# Patient Record
Sex: MALE | Race: WHITE | NOT HISPANIC OR LATINO | Employment: OTHER | URBAN - METROPOLITAN AREA
[De-identification: names, ages, dates, MRNs, and addresses within clinical notes are randomized per-mention and may not be internally consistent; named-entity substitution may affect disease eponyms.]

---

## 2020-09-05 ENCOUNTER — HOSPITAL ENCOUNTER (INPATIENT)
Facility: HOSPITAL | Age: 75
LOS: 1 days | Discharge: HOME | DRG: 602 | End: 2020-09-08
Attending: EMERGENCY MEDICINE | Admitting: EMERGENCY MEDICINE
Payer: MEDICARE

## 2020-09-05 DIAGNOSIS — D84.9 IMMUNOSUPPRESSED STATUS: Primary | ICD-10-CM

## 2020-09-05 DIAGNOSIS — J81.0 ACUTE PULMONARY EDEMA: ICD-10-CM

## 2020-09-05 DIAGNOSIS — R07.9 CHEST PAIN: ICD-10-CM

## 2020-09-05 DIAGNOSIS — Z94.1 HEART TRANSPLANT RECIPIENT: ICD-10-CM

## 2020-09-05 DIAGNOSIS — R78.81 BACTEREMIA: ICD-10-CM

## 2020-09-05 DIAGNOSIS — L03.90 CELLULITIS: ICD-10-CM

## 2020-09-05 DIAGNOSIS — R78.81 STREPTOCOCCAL BACTEREMIA: ICD-10-CM

## 2020-09-05 DIAGNOSIS — L03.114 CELLULITIS OF LEFT UPPER EXTREMITY: ICD-10-CM

## 2020-09-05 DIAGNOSIS — N18.6 ESRD (END STAGE RENAL DISEASE) ON DIALYSIS: ICD-10-CM

## 2020-09-05 DIAGNOSIS — R10.9 ABDOMINAL PAIN: ICD-10-CM

## 2020-09-05 DIAGNOSIS — L03.90 STREPTOCOCCAL CELLULITIS: ICD-10-CM

## 2020-09-05 DIAGNOSIS — Z99.2 ESRD (END STAGE RENAL DISEASE) ON DIALYSIS: ICD-10-CM

## 2020-09-05 DIAGNOSIS — R41.82 AMS (ALTERED MENTAL STATUS): ICD-10-CM

## 2020-09-05 DIAGNOSIS — E87.5 HYPERKALEMIA: ICD-10-CM

## 2020-09-05 DIAGNOSIS — B95.5 STREPTOCOCCAL BACTEREMIA: ICD-10-CM

## 2020-09-05 DIAGNOSIS — N18.6 END STAGE RENAL DISEASE: ICD-10-CM

## 2020-09-05 DIAGNOSIS — B95.5 STREPTOCOCCAL CELLULITIS: ICD-10-CM

## 2020-09-05 PROBLEM — E78.5 HLD (HYPERLIPIDEMIA): Status: ACTIVE | Noted: 2020-09-05

## 2020-09-05 PROBLEM — R79.89 ELEVATED TROPONIN: Status: ACTIVE | Noted: 2020-09-05

## 2020-09-05 PROBLEM — E11.9 TYPE 2 DIABETES MELLITUS, WITH LONG-TERM CURRENT USE OF INSULIN: Status: ACTIVE | Noted: 2020-09-05

## 2020-09-05 PROBLEM — G93.41 ENCEPHALOPATHY, METABOLIC: Status: ACTIVE | Noted: 2020-09-05

## 2020-09-05 PROBLEM — Z79.4 TYPE 2 DIABETES MELLITUS, WITH LONG-TERM CURRENT USE OF INSULIN: Status: ACTIVE | Noted: 2020-09-05

## 2020-09-05 PROBLEM — E03.9 HYPOTHYROIDISM: Status: ACTIVE | Noted: 2020-09-05

## 2020-09-05 LAB
ALBUMIN SERPL BCP-MCNC: 3.5 G/DL (ref 3.5–5.2)
ALP SERPL-CCNC: 78 U/L (ref 55–135)
ALT SERPL W/O P-5'-P-CCNC: 9 U/L (ref 10–44)
ANION GAP SERPL CALC-SCNC: 11 MMOL/L (ref 8–16)
ANION GAP SERPL CALC-SCNC: 9 MMOL/L (ref 8–16)
AST SERPL-CCNC: 17 U/L (ref 10–40)
BASOPHILS # BLD AUTO: 0.02 K/UL (ref 0–0.2)
BASOPHILS NFR BLD: 0.1 % (ref 0–1.9)
BILIRUB SERPL-MCNC: 0.8 MG/DL (ref 0.1–1)
BNP SERPL-MCNC: 1030 PG/ML (ref 0–99)
BUN SERPL-MCNC: 46 MG/DL (ref 8–23)
BUN SERPL-MCNC: 49 MG/DL (ref 8–23)
CALCIUM SERPL-MCNC: 7.5 MG/DL (ref 8.7–10.5)
CALCIUM SERPL-MCNC: 9.2 MG/DL (ref 8.7–10.5)
CHLORIDE SERPL-SCNC: 106 MMOL/L (ref 95–110)
CHLORIDE SERPL-SCNC: 109 MMOL/L (ref 95–110)
CO2 SERPL-SCNC: 18 MMOL/L (ref 23–29)
CO2 SERPL-SCNC: 19 MMOL/L (ref 23–29)
CREAT SERPL-MCNC: 4.7 MG/DL (ref 0.5–1.4)
CREAT SERPL-MCNC: 4.7 MG/DL (ref 0.5–1.4)
DIFFERENTIAL METHOD: ABNORMAL
EOSINOPHIL # BLD AUTO: 0 K/UL (ref 0–0.5)
EOSINOPHIL NFR BLD: 0.1 % (ref 0–8)
ERYTHROCYTE [DISTWIDTH] IN BLOOD BY AUTOMATED COUNT: 13.7 % (ref 11.5–14.5)
EST. GFR  (AFRICAN AMERICAN): 13 ML/MIN/1.73 M^2
EST. GFR  (AFRICAN AMERICAN): 13 ML/MIN/1.73 M^2
EST. GFR  (NON AFRICAN AMERICAN): 11 ML/MIN/1.73 M^2
EST. GFR  (NON AFRICAN AMERICAN): 11 ML/MIN/1.73 M^2
GLUCOSE SERPL-MCNC: 164 MG/DL (ref 70–110)
GLUCOSE SERPL-MCNC: 165 MG/DL (ref 70–110)
HCT VFR BLD AUTO: 33.8 % (ref 40–54)
HGB BLD-MCNC: 10.6 G/DL (ref 14–18)
IMM GRANULOCYTES # BLD AUTO: 0.07 K/UL (ref 0–0.04)
IMM GRANULOCYTES NFR BLD AUTO: 0.5 % (ref 0–0.5)
LACTATE SERPL-SCNC: 1 MMOL/L (ref 0.5–2.2)
LYMPHOCYTES # BLD AUTO: 0.9 K/UL (ref 1–4.8)
LYMPHOCYTES NFR BLD: 6 % (ref 18–48)
MCH RBC QN AUTO: 31 PG (ref 27–31)
MCHC RBC AUTO-ENTMCNC: 31.4 G/DL (ref 32–36)
MCV RBC AUTO: 99 FL (ref 82–98)
MONOCYTES # BLD AUTO: 1.1 K/UL (ref 0.3–1)
MONOCYTES NFR BLD: 7.5 % (ref 4–15)
NEUTROPHILS # BLD AUTO: 12.6 K/UL (ref 1.8–7.7)
NEUTROPHILS NFR BLD: 85.8 % (ref 38–73)
NRBC BLD-RTO: 0 /100 WBC
PLATELET # BLD AUTO: 123 K/UL (ref 150–350)
PMV BLD AUTO: 9.5 FL (ref 9.2–12.9)
POCT GLUCOSE: 161 MG/DL (ref 70–110)
POTASSIUM SERPL-SCNC: 5.4 MMOL/L (ref 3.5–5.1)
POTASSIUM SERPL-SCNC: 6.1 MMOL/L (ref 3.5–5.1)
POTASSIUM SERPL-SCNC: 6.1 MMOL/L (ref 3.5–5.1)
PROCALCITONIN SERPL IA-MCNC: 0.37 NG/ML
PROT SERPL-MCNC: 7.4 G/DL (ref 6–8.4)
RBC # BLD AUTO: 3.42 M/UL (ref 4.6–6.2)
SARS-COV-2 RDRP RESP QL NAA+PROBE: NEGATIVE
SODIUM SERPL-SCNC: 136 MMOL/L (ref 136–145)
SODIUM SERPL-SCNC: 136 MMOL/L (ref 136–145)
TROPONIN I SERPL DL<=0.01 NG/ML-MCNC: 0.03 NG/ML (ref 0–0.03)
TROPONIN I SERPL DL<=0.01 NG/ML-MCNC: 0.1 NG/ML (ref 0–0.03)
WBC # BLD AUTO: 14.64 K/UL (ref 3.9–12.7)

## 2020-09-05 PROCEDURE — G0378 HOSPITAL OBSERVATION PER HR: HCPCS

## 2020-09-05 PROCEDURE — 25000003 PHARM REV CODE 250: Performed by: NURSE PRACTITIONER

## 2020-09-05 PROCEDURE — U0002 COVID-19 LAB TEST NON-CDC: HCPCS

## 2020-09-05 PROCEDURE — 25000003 PHARM REV CODE 250: Performed by: EMERGENCY MEDICINE

## 2020-09-05 PROCEDURE — 84145 PROCALCITONIN (PCT): CPT

## 2020-09-05 PROCEDURE — 93010 EKG 12-LEAD: ICD-10-PCS | Mod: ,,, | Performed by: INTERNAL MEDICINE

## 2020-09-05 PROCEDURE — 93010 ELECTROCARDIOGRAM REPORT: CPT | Mod: ,,, | Performed by: INTERNAL MEDICINE

## 2020-09-05 PROCEDURE — 84484 ASSAY OF TROPONIN QUANT: CPT

## 2020-09-05 PROCEDURE — 84484 ASSAY OF TROPONIN QUANT: CPT | Mod: 91

## 2020-09-05 PROCEDURE — 80048 BASIC METABOLIC PNL TOTAL CA: CPT

## 2020-09-05 PROCEDURE — 96372 THER/PROPH/DIAG INJ SC/IM: CPT

## 2020-09-05 PROCEDURE — 96367 TX/PROPH/DG ADDL SEQ IV INF: CPT

## 2020-09-05 PROCEDURE — 63600175 PHARM REV CODE 636 W HCPCS: Performed by: EMERGENCY MEDICINE

## 2020-09-05 PROCEDURE — 80053 COMPREHEN METABOLIC PANEL: CPT

## 2020-09-05 PROCEDURE — 87040 BLOOD CULTURE FOR BACTERIA: CPT | Mod: 59

## 2020-09-05 PROCEDURE — 83605 ASSAY OF LACTIC ACID: CPT

## 2020-09-05 PROCEDURE — 99285 EMERGENCY DEPT VISIT HI MDM: CPT | Mod: 25

## 2020-09-05 PROCEDURE — 83880 ASSAY OF NATRIURETIC PEPTIDE: CPT

## 2020-09-05 PROCEDURE — 63600175 PHARM REV CODE 636 W HCPCS: Performed by: NURSE PRACTITIONER

## 2020-09-05 PROCEDURE — 93005 ELECTROCARDIOGRAM TRACING: CPT

## 2020-09-05 PROCEDURE — 36415 COLL VENOUS BLD VENIPUNCTURE: CPT

## 2020-09-05 PROCEDURE — 96365 THER/PROPH/DIAG IV INF INIT: CPT

## 2020-09-05 PROCEDURE — 85025 COMPLETE CBC W/AUTO DIFF WBC: CPT

## 2020-09-05 RX ORDER — GLUCAGON 1 MG
1 KIT INJECTION
Status: DISCONTINUED | OUTPATIENT
Start: 2020-09-05 | End: 2020-09-09 | Stop reason: HOSPADM

## 2020-09-05 RX ORDER — MORPHINE SULFATE 30 MG/1
1 TABLET, FILM COATED, EXTENDED RELEASE ORAL DAILY PRN
COMMUNITY
Start: 2020-08-05

## 2020-09-05 RX ORDER — CETIRIZINE HYDROCHLORIDE 10 MG/1
10 TABLET ORAL DAILY
Status: DISCONTINUED | OUTPATIENT
Start: 2020-09-06 | End: 2020-09-09 | Stop reason: HOSPADM

## 2020-09-05 RX ORDER — FLUTICASONE PROPIONATE 50 MCG
1 SPRAY, SUSPENSION (ML) NASAL DAILY PRN
COMMUNITY

## 2020-09-05 RX ORDER — LEVOTHYROXINE SODIUM 150 UG/1
150 TABLET ORAL DAILY
COMMUNITY
Start: 2020-06-04

## 2020-09-05 RX ORDER — NETARSUDIL 0.2 MG/ML
1 SOLUTION/ DROPS OPHTHALMIC; TOPICAL NIGHTLY
COMMUNITY
Start: 2020-08-17

## 2020-09-05 RX ORDER — GABAPENTIN 300 MG/1
300 CAPSULE ORAL 2 TIMES DAILY
COMMUNITY
Start: 2020-06-07

## 2020-09-05 RX ORDER — GABAPENTIN 300 MG/1
300 CAPSULE ORAL 2 TIMES DAILY
Status: DISCONTINUED | OUTPATIENT
Start: 2020-09-05 | End: 2020-09-09 | Stop reason: HOSPADM

## 2020-09-05 RX ORDER — TACROLIMUS 1 MG/1
2 CAPSULE ORAL 2 TIMES DAILY
Status: DISCONTINUED | OUTPATIENT
Start: 2020-09-05 | End: 2020-09-09 | Stop reason: HOSPADM

## 2020-09-05 RX ORDER — MYCOPHENOLATE MOFETIL 500 MG/1
500 TABLET ORAL 2 TIMES DAILY
Status: DISCONTINUED | OUTPATIENT
Start: 2020-09-06 | End: 2020-09-09 | Stop reason: HOSPADM

## 2020-09-05 RX ORDER — LEVOTHYROXINE SODIUM 150 UG/1
150 TABLET ORAL DAILY
Status: DISCONTINUED | OUTPATIENT
Start: 2020-09-06 | End: 2020-09-09 | Stop reason: HOSPADM

## 2020-09-05 RX ORDER — CETIRIZINE HYDROCHLORIDE 10 MG/1
10 TABLET ORAL DAILY
COMMUNITY

## 2020-09-05 RX ORDER — ATORVASTATIN CALCIUM 10 MG/1
20 TABLET, FILM COATED ORAL DAILY
Status: DISCONTINUED | OUTPATIENT
Start: 2020-09-06 | End: 2020-09-09 | Stop reason: HOSPADM

## 2020-09-05 RX ORDER — ACETAMINOPHEN 325 MG/1
650 TABLET ORAL EVERY 6 HOURS PRN
Status: DISCONTINUED | OUTPATIENT
Start: 2020-09-05 | End: 2020-09-09 | Stop reason: HOSPADM

## 2020-09-05 RX ORDER — ATORVASTATIN CALCIUM 20 MG/1
20 TABLET, FILM COATED ORAL DAILY
COMMUNITY
Start: 2020-06-03

## 2020-09-05 RX ORDER — METOCLOPRAMIDE 5 MG/1
5 TABLET ORAL
Status: DISCONTINUED | OUTPATIENT
Start: 2020-09-06 | End: 2020-09-09 | Stop reason: HOSPADM

## 2020-09-05 RX ORDER — IBUPROFEN 200 MG
16 TABLET ORAL
Status: DISCONTINUED | OUTPATIENT
Start: 2020-09-05 | End: 2020-09-09 | Stop reason: HOSPADM

## 2020-09-05 RX ORDER — FERROUS SULFATE 325(65) MG
1 TABLET ORAL 2 TIMES DAILY
COMMUNITY
Start: 2020-08-12

## 2020-09-05 RX ORDER — PANTOPRAZOLE SODIUM 40 MG/1
40 TABLET, DELAYED RELEASE ORAL DAILY
Status: DISCONTINUED | OUTPATIENT
Start: 2020-09-06 | End: 2020-09-09 | Stop reason: HOSPADM

## 2020-09-05 RX ORDER — INSULIN ASPART 100 [IU]/ML
1-10 INJECTION, SOLUTION INTRAVENOUS; SUBCUTANEOUS
Status: DISCONTINUED | OUTPATIENT
Start: 2020-09-05 | End: 2020-09-09 | Stop reason: HOSPADM

## 2020-09-05 RX ORDER — ESCITALOPRAM OXALATE 5 MG/1
5 TABLET ORAL DAILY
COMMUNITY

## 2020-09-05 RX ORDER — DOCUSATE SODIUM 100 MG/1
100 CAPSULE, LIQUID FILLED ORAL NIGHTLY
Status: DISCONTINUED | OUTPATIENT
Start: 2020-09-05 | End: 2020-09-09 | Stop reason: HOSPADM

## 2020-09-05 RX ORDER — PANTOPRAZOLE SODIUM 40 MG/1
40 TABLET, DELAYED RELEASE ORAL DAILY
COMMUNITY
Start: 2020-06-04

## 2020-09-05 RX ORDER — PEN NEEDLE, DIABETIC 31 GX5/16"
NEEDLE, DISPOSABLE MISCELLANEOUS
COMMUNITY
Start: 2020-08-18

## 2020-09-05 RX ORDER — GABAPENTIN 800 MG/1
800 TABLET ORAL NIGHTLY
COMMUNITY
Start: 2020-06-04

## 2020-09-05 RX ORDER — METOCLOPRAMIDE 5 MG/1
1 TABLET ORAL
COMMUNITY
Start: 2020-07-29

## 2020-09-05 RX ORDER — INSULIN ASPART 100 [IU]/ML
INJECTION, SOLUTION INTRAVENOUS; SUBCUTANEOUS
COMMUNITY

## 2020-09-05 RX ORDER — HYDROCODONE BITARTRATE AND ACETAMINOPHEN 10; 325 MG/1; MG/1
1 TABLET ORAL
Status: ON HOLD | COMMUNITY
Start: 2020-08-05 | End: 2020-09-06 | Stop reason: SDUPTHER

## 2020-09-05 RX ORDER — DONEPEZIL HYDROCHLORIDE 5 MG/1
5 TABLET, FILM COATED ORAL NIGHTLY
Status: DISCONTINUED | OUTPATIENT
Start: 2020-09-05 | End: 2020-09-09 | Stop reason: HOSPADM

## 2020-09-05 RX ORDER — FLUTICASONE PROPIONATE 50 MCG
1 SPRAY, SUSPENSION (ML) NASAL DAILY PRN
Status: DISCONTINUED | OUTPATIENT
Start: 2020-09-05 | End: 2020-09-09 | Stop reason: HOSPADM

## 2020-09-05 RX ORDER — IBUPROFEN 200 MG
24 TABLET ORAL
Status: DISCONTINUED | OUTPATIENT
Start: 2020-09-05 | End: 2020-09-09 | Stop reason: HOSPADM

## 2020-09-05 RX ORDER — TACROLIMUS 1 MG/1
2 CAPSULE ORAL 2 TIMES DAILY
COMMUNITY

## 2020-09-05 RX ORDER — DONEPEZIL HYDROCHLORIDE 5 MG/1
5 TABLET, FILM COATED ORAL NIGHTLY
COMMUNITY
Start: 2020-07-24

## 2020-09-05 RX ORDER — MYCOPHENOLATE MOFETIL 500 MG/1
TABLET ORAL 2 TIMES DAILY
COMMUNITY

## 2020-09-05 RX ORDER — ACETAMINOPHEN 325 MG/1
650 TABLET ORAL
Status: COMPLETED | OUTPATIENT
Start: 2020-09-05 | End: 2020-09-05

## 2020-09-05 RX ORDER — DOCUSATE SODIUM 100 MG/1
100 CAPSULE, LIQUID FILLED ORAL NIGHTLY
COMMUNITY

## 2020-09-05 RX ORDER — ESCITALOPRAM OXALATE 5 MG/1
5 TABLET ORAL DAILY
Status: DISCONTINUED | OUTPATIENT
Start: 2020-09-06 | End: 2020-09-09 | Stop reason: HOSPADM

## 2020-09-05 RX ORDER — FERROUS SULFATE 325(65) MG
325 TABLET, DELAYED RELEASE (ENTERIC COATED) ORAL DAILY
Status: DISCONTINUED | OUTPATIENT
Start: 2020-09-06 | End: 2020-09-09 | Stop reason: HOSPADM

## 2020-09-05 RX ORDER — LIDOCAINE AND PRILOCAINE 25; 25 MG/G; MG/G
CREAM TOPICAL
COMMUNITY

## 2020-09-05 RX ADMIN — INSULIN ASPART 1 UNITS: 100 INJECTION, SOLUTION INTRAVENOUS; SUBCUTANEOUS at 11:09

## 2020-09-05 RX ADMIN — GABAPENTIN 300 MG: 300 CAPSULE ORAL at 09:09

## 2020-09-05 RX ADMIN — ACETAMINOPHEN 650 MG: 325 TABLET ORAL at 04:09

## 2020-09-05 RX ADMIN — DOCUSATE SODIUM 100 MG: 100 CAPSULE, LIQUID FILLED ORAL at 09:09

## 2020-09-05 RX ADMIN — VANCOMYCIN HYDROCHLORIDE 2000 MG: 100 INJECTION, POWDER, LYOPHILIZED, FOR SOLUTION INTRAVENOUS at 08:09

## 2020-09-05 RX ADMIN — DONEPEZIL HYDROCHLORIDE 5 MG: 5 TABLET, FILM COATED ORAL at 09:09

## 2020-09-05 RX ADMIN — PIPERACILLIN AND TAZOBACTAM 4.5 G: 4; .5 INJECTION, POWDER, FOR SOLUTION INTRAVENOUS at 05:09

## 2020-09-05 RX ADMIN — TACROLIMUS 2 MG: 1 CAPSULE ORAL at 09:09

## 2020-09-05 RX ADMIN — SODIUM POLYSTYRENE SULFONATE 30 G: 15 SUSPENSION ORAL; RECTAL at 09:09

## 2020-09-05 NOTE — ED PROVIDER NOTES
SCRIBE #1 NOTE: I, Pasquale Dangelo, am scribing for, and in the presence of, Corby Anderson MD. I have scribed the HPI, ROS, and PEx.     SCRIBE #2 NOTE: I, Katrina Kearns, am scribing for, and in the presence of,  Ck Nettles MD. I have scribed the remaining portions of the note not scribed by Scribe #1.     History      Chief Complaint   Patient presents with    Fever     wife states fever of 101 at home with generalized fatigue; dialysis and heart transplant pt. L arm redness and swelling. given tylenol approx 20min PTA       Review of patient's allergies indicates:  No Known Allergies     HPI   HPI    9/5/2020, 3:17 PM   History obtained from the wife and patient      History of Present Illness: Rafael Doe is a 75 y.o. male patient with a h/o heart transplants and DM who presents to the Emergency Department for fever (101F) which onset gradually this morning. Wife states the pt was supposed to have dialysis at 5:30 AM today but had a fever so he was referred to the ED. Wife noted L arm swelling and erythema on the way to the ER; pt noted a hx of cellulitis. Symptoms are constant and moderate in severity. No mitigating or exacerbating factors reported. Associated sxs include generalized fatigue and confusion. Patient denies any chills, constipation, hematochezia, dysuria, hematuria, urinary frequency, n/v/d, and all other sxs at this time. No prior Tx included. No further complaints or concerns at this time.       Arrival mode: Personal vehicle     PCP: Corby Perdomo MD       Past Medical History:  History reviewed. No pertinent past medical history.    Past Surgical History:  History reviewed. No pertinent past surgical history.     Family History:  History reviewed. No pertinent family history.       Social History:  Social History     Tobacco Use    Smoking status: Unknown   Substance and Sexual Activity    Alcohol use: Unknown    Drug use: Unknown    Sexual activity: Unknown       ROS    Review of Systems   Constitutional: Positive for fatigue (generalized) and fever (101F). Negative for chills.   HENT: Negative for sore throat.    Respiratory: Negative for shortness of breath.    Cardiovascular: Negative for chest pain.   Gastrointestinal: Negative for blood in stool, constipation, diarrhea, nausea and vomiting.   Genitourinary: Negative for dysuria, frequency and hematuria.   Musculoskeletal: Negative for back pain.   Skin: Negative for rash.   Neurological: Negative for weakness.   Hematological: Does not bruise/bleed easily.   Psychiatric/Behavioral: Positive for confusion.   All other systems reviewed and are negative.    Physical Exam      Initial Vitals [09/05/20 1441]   BP Pulse Resp Temp SpO2   121/66 82 20 (!) 100.5 °F (38.1 °C) (!) 89 %      MAP       --          Physical Exam  Nursing Notes and Vital Signs Reviewed.  Constitutional: Patient is in no acute distress. Well-developed and well-nourished.  Head: Atraumatic. Normocephalic.  Eyes: PERRL. EOM intact. Conjunctivae are not pale. No scleral icterus.  ENT: Mucous membranes are moist. Oropharynx is clear and symmetric.    Neck: Supple. Full ROM. No lymphadenopathy.  Cardiovascular: Regular rate. Regular rhythm. No murmurs, rubs, or gallops. Distal pulses are 2+ and symmetric.  Pulmonary/Chest: No respiratory distress. Clear to auscultation bilaterally. No wheezing or rales.  Abdominal: Soft and distended.  There is epigastric tenderness.  No rebound, guarding, or rigidity. Good bowel sounds.  Genitourinary: No CVA tenderness  Musculoskeletal:  Lymphedema.  Right upper extremity dialysis access.  LUE: Positive for swelling and erythema of the left forearm. Negative for tenderness. ROM is normal. Cap refill distally is <2 seconds. Radial pulses are equal and 2+ bilaterally. No motor deficit. No distal sensory deficit. NVI distally.   Neurological:  Drowsy but easily arousable to voice.  Alert oriented  Psychiatric: Normal affect. Good  "eye contact. Appropriate in content.    ED Course    Procedures  ED Vital Signs:  Vitals:    09/05/20 1441 09/05/20 1500 09/05/20 1528 09/05/20 1650   BP: 121/66 (!) 119/57  (!) 142/87   Pulse: 82 83  80   Resp: 20 (!) 22  17   Temp: (!) 100.5 °F (38.1 °C)      TempSrc: Oral      SpO2: (!) 89% (!) 92%  (!) 91%   Weight:   116.8 kg (257 lb 9.6 oz)    Height: 5' 8" (1.727 m)       09/05/20 1655 09/05/20 1700 09/05/20 1800 09/05/20 1830   BP:  (!) 154/89 135/68 124/62   Pulse:  80 80 80   Resp:  (!) 24 20 (!) 21   Temp:       TempSrc:       SpO2: 96% 96% 98% 97%   Weight:       Height:        09/05/20 2004   BP: 132/74   Pulse: 86   Resp: (!) 24   Temp: 98.7 °F (37.1 °C)   TempSrc: Axillary   SpO2: 99%   Weight:    Height:        Abnormal Lab Results:  Labs Reviewed   CBC W/ AUTO DIFFERENTIAL - Abnormal; Notable for the following components:       Result Value    WBC 14.64 (*)     RBC 3.42 (*)     Hemoglobin 10.6 (*)     Hematocrit 33.8 (*)     Mean Corpuscular Volume 99 (*)     Mean Corpuscular Hemoglobin Conc 31.4 (*)     Platelets 123 (*)     Gran # (ANC) 12.6 (*)     Immature Grans (Abs) 0.07 (*)     Lymph # 0.9 (*)     Mono # 1.1 (*)     Gran% 85.8 (*)     Lymph% 6.0 (*)     All other components within normal limits   COMPREHENSIVE METABOLIC PANEL - Abnormal; Notable for the following components:    Potassium 5.4 (*)     CO2 19 (*)     Glucose 165 (*)     BUN, Bld 46 (*)     Creatinine 4.7 (*)     ALT 9 (*)     eGFR if  13 (*)     eGFR if non  11 (*)     All other components within normal limits   B-TYPE NATRIURETIC PEPTIDE - Abnormal; Notable for the following components:    BNP 1,030 (*)     All other components within normal limits   TROPONIN I - Abnormal; Notable for the following components:    Troponin I 0.103 (*)     All other components within normal limits   PROCALCITONIN - Abnormal; Notable for the following components:    Procalcitonin 0.37 (*)     All other components " within normal limits   CULTURE, BLOOD   CULTURE, BLOOD   LACTIC ACID, PLASMA   SARS-COV-2 RNA AMPLIFICATION, QUAL        All Lab Results:   Results for orders placed or performed during the hospital encounter of 09/05/20   CBC auto differential   Result Value Ref Range    WBC 14.64 (H) 3.90 - 12.70 K/uL    RBC 3.42 (L) 4.60 - 6.20 M/uL    Hemoglobin 10.6 (L) 14.0 - 18.0 g/dL    Hematocrit 33.8 (L) 40.0 - 54.0 %    Mean Corpuscular Volume 99 (H) 82 - 98 fL    Mean Corpuscular Hemoglobin 31.0 27.0 - 31.0 pg    Mean Corpuscular Hemoglobin Conc 31.4 (L) 32.0 - 36.0 g/dL    RDW 13.7 11.5 - 14.5 %    Platelets 123 (L) 150 - 350 K/uL    MPV 9.5 9.2 - 12.9 fL    Immature Granulocytes 0.5 0.0 - 0.5 %    Gran # (ANC) 12.6 (H) 1.8 - 7.7 K/uL    Immature Grans (Abs) 0.07 (H) 0.00 - 0.04 K/uL    Lymph # 0.9 (L) 1.0 - 4.8 K/uL    Mono # 1.1 (H) 0.3 - 1.0 K/uL    Eos # 0.0 0.0 - 0.5 K/uL    Baso # 0.02 0.00 - 0.20 K/uL    nRBC 0 0 /100 WBC    Gran% 85.8 (H) 38.0 - 73.0 %    Lymph% 6.0 (L) 18.0 - 48.0 %    Mono% 7.5 4.0 - 15.0 %    Eosinophil% 0.1 0.0 - 8.0 %    Basophil% 0.1 0.0 - 1.9 %    Differential Method Automated    Comprehensive metabolic panel   Result Value Ref Range    Sodium 136 136 - 145 mmol/L    Potassium 5.4 (H) 3.5 - 5.1 mmol/L    Chloride 106 95 - 110 mmol/L    CO2 19 (L) 23 - 29 mmol/L    Glucose 165 (H) 70 - 110 mg/dL    BUN, Bld 46 (H) 8 - 23 mg/dL    Creatinine 4.7 (H) 0.5 - 1.4 mg/dL    Calcium 9.2 8.7 - 10.5 mg/dL    Total Protein 7.4 6.0 - 8.4 g/dL    Albumin 3.5 3.5 - 5.2 g/dL    Total Bilirubin 0.8 0.1 - 1.0 mg/dL    Alkaline Phosphatase 78 55 - 135 U/L    AST 17 10 - 40 U/L    ALT 9 (L) 10 - 44 U/L    Anion Gap 11 8 - 16 mmol/L    eGFR if African American 13 (A) >60 mL/min/1.73 m^2    eGFR if non African American 11 (A) >60 mL/min/1.73 m^2   Lactic Acid, Plasma   Result Value Ref Range    Lactate (Lactic Acid) 1.0 0.5 - 2.2 mmol/L   Brain Natriuretic Peptide   Result Value Ref Range    BNP 1,030 (H) 0 -  99 pg/mL   Troponin I   Result Value Ref Range    Troponin I 0.103 (H) 0.000 - 0.026 ng/mL   Procalcitonin   Result Value Ref Range    Procalcitonin 0.37 (H) <0.25 ng/mL   COVID-19 Rapid Screening   Result Value Ref Range    SARS-CoV-2 RNA, Amplification, Qual Negative Negative         Imaging Results:  Imaging Results          X-Ray Chest 1 View (Final result)  Result time 09/05/20 17:23:04    Final result by Lopez Burrell MD (09/05/20 17:23:04)                 Impression:      See above.      Electronically signed by: Lopez Burrell MD  Date:    09/05/2020  Time:    17:23             Narrative:    EXAMINATION:  XR CHEST 1 VIEW    CLINICAL HISTORY:  Abdominal pain.    FINDINGS:  No prior study.  Cardiomegaly status post CABG.  Right chest wall pacemaker with intact lead.  Right chest wall MediPort with catheter tip right atrium.  Pulmonary vasculature appears congested.  Bibasilar pleuroparenchymal bands with intermixed patchy opacities.  Left greater than right costophrenic angle blunting, pleural thickening versus minimal pleural fluid.                               CT Abdomen Pelvis  Without Contrast (Final result)  Result time 09/05/20 17:01:20    Final result by Lopez Burrell MD (09/05/20 17:01:20)                 Impression:      1. There is no CT evidence of an acute intra-abdominal inflammatory process with history of epigastric pain.  2. Subtle nodular liver surface contour.  Is there history of cirrhosis?  No ascites.  Splenomegaly, 15 cm.  3. No bowel obstruction.  There is stool filling the descending, sigmoid, and rectum.  Correlate for constipation/impaction.  4. Marked bilateral renal atrophy.  5. Marked atherosclerosis.  6. Mild cardiomegaly.  7. Prominent gynecomastia.  All CT scans at this facility are performed  using dose modulation techniques as appropriate to performed exam including the following:  automated exposure control; adjustment of mA and/or kV according to the patients size (this  includes techniques or standardized protocols for targeted exams where dose is matched to indication/reason for exam: i.e. extremities or head);  iterative reconstruction technique.      Electronically signed by: Lopez Burrell MD  Date:    09/05/2020  Time:    17:01             Narrative:    EXAMINATION:  CT ABDOMEN PELVIS WITHOUT CONTRAST    CLINICAL HISTORY:  Epigastric pain;    TECHNIQUE:  Abdominal and pelvic CT performed without contrast.  Coronal and sagittal reformations.    COMPARISON:  None    FINDINGS:  Abdominal CT.  Mild cardiomegaly.  There is marked bilateral gynecomastia.  Bibasilar thick pleuroparenchymal bands of atelectasis or scarring.  Superimposed active infiltrate not excluded particularly at the left lung base with patchy consolidation.    There is mild splenomegaly, 15 x 13 x 5 cm.  Subtle nodular surface contour liver.  Is there history of cirrhosis?  Otherwise, noncontrast evaluation liver, spleen, pancreas, and adrenal glands unremarkable.  There is marked bilateral renal cortical atrophy.  No hydronephrosis.  Bilateral renal artery calcifications.    Normal caliber aorta.  Extensive atherosclerosis aortic, SMA, celiac, normal gallbladder.  No bile duct dilatation.    No bowel obstruction.  The appendix is normal.  There is short segment narrowing of the distal transverse colon, probable contraction without discrete masslike wall thickening.    Degenerative spine.    Focal ventral abdominal wall eventration at the umbilicus without a significant hernia.    Pelvic CT.  The pelvic ring is intact.    Stool-filled descending, sigmoid, and rectum.  Correlate for constipation/impaction.  Pelvic bowel loops are otherwise unremarkable.    Ureters and urinary bladder are unremarkable.  No pelvic mass, free fluid, or lymphadenopathy.  Normal sized prostate gland.  Atherosclerosis.                               CT Head Without Contrast (Final result)  Result time 09/05/20 16:36:20    Final result by  Lopez Burrell MD (09/05/20 16:36:20)                 Impression:      Unremarkable head CT for age.    All CT scans at this facility are performed  using dose modulation techniques as appropriate to performed exam including the following:  automated exposure control; adjustment of mA and/or kV according to the patients size (this includes techniques or standardized protocols for targeted exams where dose is matched to indication/reason for exam: i.e. extremities or head);  iterative reconstruction technique.      Electronically signed by: Lopez Burrell MD  Date:    09/05/2020  Time:    16:36             Narrative:    EXAMINATION:  CT HEAD WITHOUT CONTRAST    CLINICAL HISTORY:  transient alteration of awareness, unspecified    TECHNIQUE:  Routine noncontrast head CT    COMPARISON:  None    FINDINGS:  There is patient motion artifact with subsequent repeat scan with continued beam hardening/motion artifact at the skull base.  There is no evidence of an acute intracranial hemorrhage or abnormal extra-axial fluid collection.  There is age-appropriate moderate cerebral atrophy with ventricular-sulcal congruence.  There is no abnormal increased or decreased density within the brain parenchyma.  Gray-white differentiation preserved.  There is no intracranial mass or mass effect.  The calvarium is intact.  Visualized paranasal sinuses and mastoids are well aerated.                               The EKG was ordered, reviewed, and independently interpreted by the ED provider.  Interpretation time: 1537  Rate: 80 BPM  Rhythm: Atrial-paced rhythm  Interpretation: Left axis deviation. RBBB. Inferior infarct, age undetermined. Abnormal ECG No STEMI.         The Emergency Provider reviewed the vital signs and test results, which are outlined above.    ED Discussion     4:00 PM: Dr. Anderson transfers care of patient to Dr. Nettles pending lab and radiology results.    5:15 PM:  Discussed case heart transplant [facility]  Dr. Allred.  See below    5:20 PM: Dr. Nettles is at bedside to evaluate the pt.     6:36 PM: Discussed pt's case with Dr. Rajwinder Lacy (Nephrology) to inform him that the patient is a heart transplant patient and ESRD patient that is being admitted for cellulitis. The patient will require dialysis during the admission.    7:23 PM: Discussed case with Yomaira Mccall NP ([facility]). Yomaira Mccall NP agrees with current care and management of pt and accepts admission.   Admitting Service: [facility]  Admitting Physician: Dr.Majid PAT Beavers  Admit to: Observation - Telemetry    7:34 PM: Re-evaluated pt. I have discussed test results, shared treatment plan, and the need for admission with patient at bedside. Pt expresses understanding at this time and agree with all information. All questions answered. Pt has no further questions or concerns at this time. Pt is ready for admit.      ED Course as of Sep 05 2044   Sat Sep 05, 2020   1824 Consulted Heart Transplant at [facility] and spoke with Dr. Allred.  He states that is appropriate for patient to be hospitalized here in [place].  He recommends continuing Prograf, holding CellCept during hospitalization, and removing the port if patient has bacteremia.    [DP]      ED Course User Index  [DP] Ck Nettles MD       ED Medication(s):  Medications   vancomycin - pharmacy to dose (has no administration in time range)   vancomycin 2 g in dextrose 5 % 500 mL IVPB (2,000 mg Intravenous New Bag 9/5/20 2015)   piperacillin-tazobactam 4.5 g in dextrose 5 % 100 mL IVPB (ready to mix system) (has no administration in time range)   glucose chewable tablet 16 g (has no administration in time range)   glucose chewable tablet 24 g (has no administration in time range)   dextrose 50% injection 12.5 g (has no administration in time range)   dextrose 50% injection 25 g (has no administration in time range)   glucagon (human recombinant) injection 1 mg (has  no administration in time range)   atorvastatin tablet 20 mg (has no administration in time range)   cetirizine tablet 10 mg (has no administration in time range)   docusate sodium capsule 100 mg (has no administration in time range)   donepeziL tablet 5 mg (has no administration in time range)   escitalopram oxalate tablet 5 mg (has no administration in time range)   ferrous sulfate EC tablet 325 mg (has no administration in time range)   fluticasone propionate 50 mcg/actuation nasal spray 50 mcg (has no administration in time range)   gabapentin capsule 300 mg (has no administration in time range)   levothyroxine tablet 150 mcg (has no administration in time range)   metoclopramide HCl tablet 5 mg (has no administration in time range)   multivitamin tablet (has no administration in time range)   pantoprazole EC tablet 40 mg (has no administration in time range)   netarsudiL 0.02 % ophthalmic solution 1 drop (has no administration in time range)   tacrolimus capsule 2 mg (has no administration in time range)   sodium polystyrene 15 gram/60 mL suspension 30 g (has no administration in time range)   acetaminophen tablet 650 mg (has no administration in time range)   piperacillin-tazobactam 4.5 g in dextrose 5 % 100 mL IVPB (ready to mix system) (0 g Intravenous Stopped 9/5/20 1800)   acetaminophen tablet 650 mg (650 mg Oral Given 9/5/20 1634)           Current Discharge Medication List            Medical Decision Making    Medical Decision Making:   Clinical Tests:   Lab Tests: Ordered and Reviewed  Radiological Study: Ordered and Reviewed  Medical Tests: Ordered and Reviewed           Scribe Attestation:   Scribe #1: I performed the above scribed service and the documentation accurately describes the services I performed. I attest to the accuracy of the note.    Attending:   Physician Attestation Statement for Scribe #1: I, Corby Anderson MD, personally performed the services described in this  documentation, as scribed by Pasquale Dangelo, in my presence, and it is both accurate and complete.       Scribe Attestation:   Scribe #2: I performed the above scribed service and the documentation accurately describes the services I performed. I attest to the accuracy of the note.    Attending Attestation:           Physician Attestation for Scribe:    Physician Attestation Statement for Scribe #2: I, Ck Nettles MD, reviewed documentation, as scribed by Katrina Kearns in my presence, and it is both accurate and complete. I also acknowledge and confirm the content of the note done by Scribe #1.          Clinical Impression       ICD-10-CM ICD-9-CM   1. Immunosuppressed status  D89.9 279.9   2. Abdominal pain  R10.9 789.00   3. AMS (altered mental status)  R41.82 780.97   4. Cellulitis  L03.90 682.9   5. Chest pain  R07.9 786.50   6. End stage renal disease  N18.6 585.6   7. Hyperkalemia  E87.5 276.7   8. Heart transplant recipient  Z94.1 V42.1       Disposition:   Disposition: Placed in Observation  Condition: Fair         Ck Nettles MD  09/05/20 1728

## 2020-09-05 NOTE — ED NOTES
"Pt's wife provided names and telephone numbers of pt's medical team:    Dr. Zia Nguyen ("transplant doctor at [facility]"):  [phone]  Ananya ("transplant coordinator at [facility]"):  [phone]  After Hours ("transplant coordinator at [facility]"):  [phone]  Dr. King (cardiologist):  [phone]  Dr. Corby Perdomo (General Practice):  [phone]  "

## 2020-09-06 PROBLEM — G93.41 ENCEPHALOPATHY, METABOLIC: Status: RESOLVED | Noted: 2020-09-05 | Resolved: 2020-09-06

## 2020-09-06 PROBLEM — E87.5 HYPERKALEMIA: Status: RESOLVED | Noted: 2020-09-05 | Resolved: 2020-09-06

## 2020-09-06 LAB
ANION GAP SERPL CALC-SCNC: 11 MMOL/L (ref 8–16)
ANION GAP SERPL CALC-SCNC: 12 MMOL/L (ref 8–16)
ANION GAP SERPL CALC-SCNC: 13 MMOL/L (ref 8–16)
ANION GAP SERPL CALC-SCNC: 15 MMOL/L (ref 8–16)
ANION GAP SERPL CALC-SCNC: 15 MMOL/L (ref 8–16)
ANION GAP SERPL CALC-SCNC: 17 MMOL/L (ref 8–16)
BASOPHILS # BLD AUTO: 0.01 K/UL (ref 0–0.2)
BASOPHILS NFR BLD: 0.1 % (ref 0–1.9)
BUN SERPL-MCNC: 22 MG/DL (ref 8–23)
BUN SERPL-MCNC: 26 MG/DL (ref 8–23)
BUN SERPL-MCNC: 49 MG/DL (ref 8–23)
BUN SERPL-MCNC: 50 MG/DL (ref 8–23)
BUN SERPL-MCNC: 50 MG/DL (ref 8–23)
BUN SERPL-MCNC: 51 MG/DL (ref 8–23)
BUN SERPL-MCNC: 51 MG/DL (ref 8–23)
BUN SERPL-MCNC: 53 MG/DL (ref 8–23)
CALCIUM SERPL-MCNC: 8.9 MG/DL (ref 8.7–10.5)
CALCIUM SERPL-MCNC: 8.9 MG/DL (ref 8.7–10.5)
CALCIUM SERPL-MCNC: 9 MG/DL (ref 8.7–10.5)
CALCIUM SERPL-MCNC: 9 MG/DL (ref 8.7–10.5)
CALCIUM SERPL-MCNC: 9.1 MG/DL (ref 8.7–10.5)
CALCIUM SERPL-MCNC: 9.2 MG/DL (ref 8.7–10.5)
CALCIUM SERPL-MCNC: 9.3 MG/DL (ref 8.7–10.5)
CALCIUM SERPL-MCNC: 9.3 MG/DL (ref 8.7–10.5)
CHLORIDE SERPL-SCNC: 102 MMOL/L (ref 95–110)
CHLORIDE SERPL-SCNC: 105 MMOL/L (ref 95–110)
CHLORIDE SERPL-SCNC: 106 MMOL/L (ref 95–110)
CHLORIDE SERPL-SCNC: 107 MMOL/L (ref 95–110)
CHLORIDE SERPL-SCNC: 108 MMOL/L (ref 95–110)
CHLORIDE SERPL-SCNC: 109 MMOL/L (ref 95–110)
CO2 SERPL-SCNC: 17 MMOL/L (ref 23–29)
CO2 SERPL-SCNC: 18 MMOL/L (ref 23–29)
CO2 SERPL-SCNC: 19 MMOL/L (ref 23–29)
CO2 SERPL-SCNC: 20 MMOL/L (ref 23–29)
CO2 SERPL-SCNC: 21 MMOL/L (ref 23–29)
CO2 SERPL-SCNC: 26 MMOL/L (ref 23–29)
CREAT SERPL-MCNC: 2.8 MG/DL (ref 0.5–1.4)
CREAT SERPL-MCNC: 2.9 MG/DL (ref 0.5–1.4)
CREAT SERPL-MCNC: 4.6 MG/DL (ref 0.5–1.4)
CREAT SERPL-MCNC: 4.7 MG/DL (ref 0.5–1.4)
CREAT SERPL-MCNC: 4.8 MG/DL (ref 0.5–1.4)
CREAT SERPL-MCNC: 4.9 MG/DL (ref 0.5–1.4)
DIFFERENTIAL METHOD: ABNORMAL
EOSINOPHIL # BLD AUTO: 0 K/UL (ref 0–0.5)
EOSINOPHIL NFR BLD: 0.5 % (ref 0–8)
ERYTHROCYTE [DISTWIDTH] IN BLOOD BY AUTOMATED COUNT: 13.9 % (ref 11.5–14.5)
EST. GFR  (AFRICAN AMERICAN): 12 ML/MIN/1.73 M^2
EST. GFR  (AFRICAN AMERICAN): 13 ML/MIN/1.73 M^2
EST. GFR  (AFRICAN AMERICAN): 23 ML/MIN/1.73 M^2
EST. GFR  (AFRICAN AMERICAN): 24 ML/MIN/1.73 M^2
EST. GFR  (NON AFRICAN AMERICAN): 11 ML/MIN/1.73 M^2
EST. GFR  (NON AFRICAN AMERICAN): 12 ML/MIN/1.73 M^2
EST. GFR  (NON AFRICAN AMERICAN): 20 ML/MIN/1.73 M^2
EST. GFR  (NON AFRICAN AMERICAN): 21 ML/MIN/1.73 M^2
ESTIMATED AVG GLUCOSE: 131 MG/DL (ref 68–131)
GLUCOSE SERPL-MCNC: 130 MG/DL (ref 70–110)
GLUCOSE SERPL-MCNC: 132 MG/DL (ref 70–110)
GLUCOSE SERPL-MCNC: 142 MG/DL (ref 70–110)
GLUCOSE SERPL-MCNC: 154 MG/DL (ref 70–110)
GLUCOSE SERPL-MCNC: 156 MG/DL (ref 70–110)
GLUCOSE SERPL-MCNC: 163 MG/DL (ref 70–110)
GLUCOSE SERPL-MCNC: 197 MG/DL (ref 70–110)
GLUCOSE SERPL-MCNC: 197 MG/DL (ref 70–110)
HBA1C MFR BLD HPLC: 6.2 % (ref 4–5.6)
HCT VFR BLD AUTO: 31 % (ref 40–54)
HGB BLD-MCNC: 9.6 G/DL (ref 14–18)
IMM GRANULOCYTES # BLD AUTO: 0.05 K/UL (ref 0–0.04)
IMM GRANULOCYTES NFR BLD AUTO: 0.6 % (ref 0–0.5)
LYMPHOCYTES # BLD AUTO: 0.9 K/UL (ref 1–4.8)
LYMPHOCYTES NFR BLD: 10.9 % (ref 18–48)
MAGNESIUM SERPL-MCNC: 2.1 MG/DL (ref 1.6–2.6)
MCH RBC QN AUTO: 31 PG (ref 27–31)
MCHC RBC AUTO-ENTMCNC: 31 G/DL (ref 32–36)
MCV RBC AUTO: 100 FL (ref 82–98)
MONOCYTES # BLD AUTO: 0.8 K/UL (ref 0.3–1)
MONOCYTES NFR BLD: 9.7 % (ref 4–15)
NEUTROPHILS # BLD AUTO: 6.6 K/UL (ref 1.8–7.7)
NEUTROPHILS NFR BLD: 78.2 % (ref 38–73)
NRBC BLD-RTO: 0 /100 WBC
PLATELET # BLD AUTO: 118 K/UL (ref 150–350)
PLATELET BLD QL SMEAR: ABNORMAL
PMV BLD AUTO: 10.3 FL (ref 9.2–12.9)
POCT GLUCOSE: 134 MG/DL (ref 70–110)
POCT GLUCOSE: 166 MG/DL (ref 70–110)
POCT GLUCOSE: 178 MG/DL (ref 70–110)
POCT GLUCOSE: 182 MG/DL (ref 70–110)
POCT GLUCOSE: 208 MG/DL (ref 70–110)
POTASSIUM SERPL-SCNC: 3.6 MMOL/L (ref 3.5–5.1)
POTASSIUM SERPL-SCNC: 3.6 MMOL/L (ref 3.5–5.1)
POTASSIUM SERPL-SCNC: 4.7 MMOL/L (ref 3.5–5.1)
POTASSIUM SERPL-SCNC: 4.7 MMOL/L (ref 3.5–5.1)
POTASSIUM SERPL-SCNC: 4.9 MMOL/L (ref 3.5–5.1)
POTASSIUM SERPL-SCNC: 4.9 MMOL/L (ref 3.5–5.1)
POTASSIUM SERPL-SCNC: 5 MMOL/L (ref 3.5–5.1)
POTASSIUM SERPL-SCNC: 5 MMOL/L (ref 3.5–5.1)
POTASSIUM SERPL-SCNC: 5.4 MMOL/L (ref 3.5–5.1)
POTASSIUM SERPL-SCNC: 5.6 MMOL/L (ref 3.5–5.1)
POTASSIUM SERPL-SCNC: 5.6 MMOL/L (ref 3.5–5.1)
POTASSIUM SERPL-SCNC: 6.6 MMOL/L (ref 3.5–5.1)
POTASSIUM SERPL-SCNC: 6.6 MMOL/L (ref 3.5–5.1)
RBC # BLD AUTO: 3.1 M/UL (ref 4.6–6.2)
SODIUM SERPL-SCNC: 139 MMOL/L (ref 136–145)
SODIUM SERPL-SCNC: 140 MMOL/L (ref 136–145)
TROPONIN I SERPL DL<=0.01 NG/ML-MCNC: 0.07 NG/ML (ref 0–0.03)
VANCOMYCIN SERPL-MCNC: 12.7 UG/ML
WBC # BLD AUTO: 8.37 K/UL (ref 3.9–12.7)

## 2020-09-06 PROCEDURE — G0257 UNSCHED DIALYSIS ESRD PT HOS: HCPCS

## 2020-09-06 PROCEDURE — 83735 ASSAY OF MAGNESIUM: CPT

## 2020-09-06 PROCEDURE — 25000003 PHARM REV CODE 250: Performed by: NURSE PRACTITIONER

## 2020-09-06 PROCEDURE — 84484 ASSAY OF TROPONIN QUANT: CPT

## 2020-09-06 PROCEDURE — 99205 PR OFFICE/OUTPT VISIT, NEW, LEVL V, 60-74 MIN: ICD-10-PCS | Mod: 25,,, | Performed by: INTERNAL MEDICINE

## 2020-09-06 PROCEDURE — 90935 HEMODIALYSIS ONE EVALUATION: CPT | Mod: ,,, | Performed by: INTERNAL MEDICINE

## 2020-09-06 PROCEDURE — 96376 TX/PRO/DX INJ SAME DRUG ADON: CPT

## 2020-09-06 PROCEDURE — 99900035 HC TECH TIME PER 15 MIN (STAT)

## 2020-09-06 PROCEDURE — 80202 ASSAY OF VANCOMYCIN: CPT

## 2020-09-06 PROCEDURE — 80048 BASIC METABOLIC PNL TOTAL CA: CPT

## 2020-09-06 PROCEDURE — 83036 HEMOGLOBIN GLYCOSYLATED A1C: CPT

## 2020-09-06 PROCEDURE — 96372 THER/PROPH/DIAG INJ SC/IM: CPT

## 2020-09-06 PROCEDURE — 36415 COLL VENOUS BLD VENIPUNCTURE: CPT

## 2020-09-06 PROCEDURE — G0378 HOSPITAL OBSERVATION PER HR: HCPCS

## 2020-09-06 PROCEDURE — 85025 COMPLETE CBC W/AUTO DIFF WBC: CPT

## 2020-09-06 PROCEDURE — 27000221 HC OXYGEN, UP TO 24 HOURS

## 2020-09-06 PROCEDURE — 80048 BASIC METABOLIC PNL TOTAL CA: CPT | Mod: 91

## 2020-09-06 PROCEDURE — 63600175 PHARM REV CODE 636 W HCPCS: Performed by: NURSE PRACTITIONER

## 2020-09-06 PROCEDURE — 94761 N-INVAS EAR/PLS OXIMETRY MLT: CPT

## 2020-09-06 PROCEDURE — 80100016 HC MAINTENANCE HEMODIALYSIS

## 2020-09-06 PROCEDURE — 86706 HEP B SURFACE ANTIBODY: CPT

## 2020-09-06 PROCEDURE — 99205 OFFICE O/P NEW HI 60 MIN: CPT | Mod: 25,,, | Performed by: INTERNAL MEDICINE

## 2020-09-06 PROCEDURE — 87340 HEPATITIS B SURFACE AG IA: CPT

## 2020-09-06 PROCEDURE — 96375 TX/PRO/DX INJ NEW DRUG ADDON: CPT

## 2020-09-06 PROCEDURE — 90935 PR HEMODIALYSIS, ONE EVALUATION: ICD-10-PCS | Mod: ,,, | Performed by: INTERNAL MEDICINE

## 2020-09-06 RX ORDER — CEPHALEXIN 500 MG/1
500 CAPSULE ORAL EVERY 12 HOURS
Qty: 20 CAPSULE | Refills: 0 | Status: CANCELLED | OUTPATIENT
Start: 2020-09-06

## 2020-09-06 RX ORDER — HYDROCODONE BITARTRATE AND ACETAMINOPHEN 10; 325 MG/1; MG/1
1 TABLET ORAL
Qty: 20 TABLET | Refills: 0 | Status: SHIPPED | OUTPATIENT
Start: 2020-09-06

## 2020-09-06 RX ORDER — CEPHALEXIN 500 MG/1
500 CAPSULE ORAL EVERY 12 HOURS
Status: DISCONTINUED | OUTPATIENT
Start: 2020-09-06 | End: 2020-09-07

## 2020-09-06 RX ORDER — CLINDAMYCIN HYDROCHLORIDE 300 MG/1
300 CAPSULE ORAL 3 TIMES DAILY
Qty: 30 CAPSULE | Refills: 0 | Status: CANCELLED | OUTPATIENT
Start: 2020-09-06

## 2020-09-06 RX ORDER — SULFAMETHOXAZOLE AND TRIMETHOPRIM 800; 160 MG/1; MG/1
1 TABLET ORAL DAILY
Qty: 10 TABLET | Refills: 0 | Status: SHIPPED | OUTPATIENT
Start: 2020-09-06

## 2020-09-06 RX ORDER — VANCOMYCIN HCL IN 5 % DEXTROSE 1G/250ML
1000 PLASTIC BAG, INJECTION (ML) INTRAVENOUS ONCE
Status: COMPLETED | OUTPATIENT
Start: 2020-09-07 | End: 2020-09-07

## 2020-09-06 RX ORDER — CLINDAMYCIN HYDROCHLORIDE 150 MG/1
300 CAPSULE ORAL EVERY 6 HOURS
Status: DISCONTINUED | OUTPATIENT
Start: 2020-09-06 | End: 2020-09-07

## 2020-09-06 RX ORDER — HYDROCODONE BITARTRATE AND ACETAMINOPHEN 10; 325 MG/1; MG/1
1 TABLET ORAL EVERY 6 HOURS PRN
Status: DISCONTINUED | OUTPATIENT
Start: 2020-09-06 | End: 2020-09-09 | Stop reason: HOSPADM

## 2020-09-06 RX ADMIN — INSULIN HUMAN 10 UNITS: 100 INJECTION, SOLUTION PARENTERAL at 02:09

## 2020-09-06 RX ADMIN — HYDROCODONE BITARTRATE AND ACETAMINOPHEN 1 TABLET: 10; 325 TABLET ORAL at 05:09

## 2020-09-06 RX ADMIN — DONEPEZIL HYDROCHLORIDE 5 MG: 5 TABLET, FILM COATED ORAL at 10:09

## 2020-09-06 RX ADMIN — TACROLIMUS 2 MG: 1 CAPSULE ORAL at 10:09

## 2020-09-06 RX ADMIN — CALCIUM GLUCONATE 1 G: 98 INJECTION, SOLUTION INTRAVENOUS at 05:09

## 2020-09-06 RX ADMIN — CLINDAMYCIN HYDROCHLORIDE 300 MG: 150 CAPSULE ORAL at 05:09

## 2020-09-06 RX ADMIN — ATORVASTATIN CALCIUM 20 MG: 10 TABLET, FILM COATED ORAL at 10:09

## 2020-09-06 RX ADMIN — THERA TABS 1 TABLET: TAB at 11:09

## 2020-09-06 RX ADMIN — METOCLOPRAMIDE HYDROCHLORIDE 5 MG: 5 TABLET ORAL at 11:09

## 2020-09-06 RX ADMIN — MYCOPHENOLATE MOFETIL 500 MG: 500 TABLET ORAL at 11:09

## 2020-09-06 RX ADMIN — MYCOPHENOLATE MOFETIL 500 MG: 500 TABLET ORAL at 10:09

## 2020-09-06 RX ADMIN — DEXTROSE MONOHYDRATE 25 G: 500 INJECTION PARENTERAL at 02:09

## 2020-09-06 RX ADMIN — TACROLIMUS 2 MG: 1 CAPSULE ORAL at 05:09

## 2020-09-06 RX ADMIN — GABAPENTIN 300 MG: 300 CAPSULE ORAL at 10:09

## 2020-09-06 RX ADMIN — PANTOPRAZOLE SODIUM 40 MG: 40 TABLET, DELAYED RELEASE ORAL at 10:09

## 2020-09-06 RX ADMIN — SODIUM POLYSTYRENE SULFONATE 30 G: 15 SUSPENSION ORAL; RECTAL at 12:09

## 2020-09-06 RX ADMIN — CALCIUM GLUCONATE 1 G: 98 INJECTION, SOLUTION INTRAVENOUS at 02:09

## 2020-09-06 RX ADMIN — FERROUS SULFATE TAB EC 325 MG (65 MG FE EQUIVALENT) 325 MG: 325 (65 FE) TABLET DELAYED RESPONSE at 10:09

## 2020-09-06 RX ADMIN — HYDROCODONE BITARTRATE AND ACETAMINOPHEN 1 TABLET: 10; 325 TABLET ORAL at 11:09

## 2020-09-06 RX ADMIN — LEVOTHYROXINE SODIUM 150 MCG: 150 TABLET ORAL at 05:09

## 2020-09-06 RX ADMIN — INSULIN ASPART 2 UNITS: 100 INJECTION, SOLUTION INTRAVENOUS; SUBCUTANEOUS at 06:09

## 2020-09-06 RX ADMIN — ESCITALOPRAM OXALATE 5 MG: 5 TABLET, FILM COATED ORAL at 10:09

## 2020-09-06 RX ADMIN — CLINDAMYCIN HYDROCHLORIDE 300 MG: 150 CAPSULE ORAL at 11:09

## 2020-09-06 RX ADMIN — PIPERACILLIN AND TAZOBACTAM 4.5 G: 4; .5 INJECTION, POWDER, LYOPHILIZED, FOR SOLUTION INTRAVENOUS; PARENTERAL at 05:09

## 2020-09-06 RX ADMIN — CEPHALEXIN 500 MG: 500 CAPSULE ORAL at 11:09

## 2020-09-06 RX ADMIN — DOCUSATE SODIUM 100 MG: 100 CAPSULE, LIQUID FILLED ORAL at 10:09

## 2020-09-06 RX ADMIN — SODIUM POLYSTYRENE SULFONATE 30 G: 15 SUSPENSION ORAL; RECTAL at 03:09

## 2020-09-06 RX ADMIN — VANCOMYCIN HYDROCHLORIDE 1000 MG: 1 INJECTION, POWDER, LYOPHILIZED, FOR SOLUTION INTRAVENOUS at 11:09

## 2020-09-06 RX ADMIN — METOCLOPRAMIDE HYDROCHLORIDE 5 MG: 5 TABLET ORAL at 06:09

## 2020-09-06 RX ADMIN — CETIRIZINE HYDROCHLORIDE 10 MG: 10 TABLET, FILM COATED ORAL at 10:09

## 2020-09-06 NOTE — SIGNIFICANT EVENT
Notified of critical potassium level on midnight labs of 6.6 in spite of pt receiving a dose of kayexalate - additional doses ordered in addition to insulin, D50 and calcium gluconate. However, was then later notified by bedside nurse that pt had lost IV access and that multiple attempts to regain access have been unsuccessful. Discussed with Dr. Beavers - recommends midline placement d/t critical need to administer these medications. ED called and AVA Moore to bedside - midline successfully placed and medications given. Trending potassium level, will continue to treat as indicated.

## 2020-09-06 NOTE — H&P
[facility] Hospital Medicine  History & Physical    Patient Name: Rafael Doe  MRN: [id number]  Admission Date: 9/5/2020  Attending Physician: Aleisha Beavers MD   Primary Care Provider: Corby Perdomo MD         Patient information was obtained from patient, past medical records and ER records.     Subjective:     Principal Problem:Cellulitis    Chief Complaint:   Chief Complaint   Patient presents with    Fever     wife states fever of 101 at home with generalized fatigue; dialysis and heart transplant pt. L arm redness and swelling. given tylenol approx 20min PTA        HPI: 76 y/o WM with hx of ESRD on HD (TTS), DM2, CAGB (1995), LVAD (2010), heart transplant (2012), HLD, anemia, hypothyroidism, GERD, pacemaker, mediport, hiatal hernia, merkel cell carcinoma, AV fistula, recurrent cellulitis to ED with c/o gradually worsening fatigue, weakness, confusion, fever (Tmax 101.0), and LUE edema, warmth and redness since last night, causing him to miss HD this AM. No exacerbating or mitigating factors noted - symptoms are persistent and of moderate severity. ROS is limited by AMS - per pt's wife, they are displaced from [place]/t [place]. Found in ED to be febrile (100.5), but VS otherwise stable, with leukocytosis (14.64), hyperkalemia (5.4), hyperglycemia (165), elevated creatinine (4.7), BNP (1030), troponin (0.103) and procalcitonin (0.37). EKG showed a paced rhythm, CT of the abdomen/pelvis was non-acute, CT of the head was unremarkable, Cxray showed congestion, COVID-19 negative. Pt was given po tylenol and IV vancomycin and zosyn in ED with resolution of fever - confusion remains but pt denies pain. Hospital medicine was called and pt placed in observation on telemetry. Pt is a full code - surrogate decision maker is his wife, Keyanna Doe.     Past Medical History:   Diagnosis Date    Anemia     Cellulitis     Depression     Diabetes mellitus     ESRD (end stage renal  "disease) on dialysis     GERD (gastroesophageal reflux disease)     Heart transplanted 2012    Hiatal hernia     HLD (hyperlipidemia)     Hypothyroidism     Merkel cell carcinoma     Renal disorder     on dialysis    Seasonal allergies        Past Surgical History:   Procedure Laterality Date    AV FISTULA PLACEMENT Right     CORONARY ARTERY BYPASS GRAFT      HEART TRANSPLANT  2012    HEART TRANSPLANT      INSERTION OF PACEMAKER      LEFT VENTRICULAR ASSIST DEVICE      MEDIPORT INSERTION, SINGLE         Review of patient's allergies indicates:  No Known Allergies    No current facility-administered medications on file prior to encounter.      Current Outpatient Medications on File Prior to Encounter   Medication Sig    atorvastatin (LIPITOR) 20 MG tablet Take 20 mg by mouth once daily.    BD ULTRA-FINE MINI PEN NEEDLE 31 gauge x 3/16" Ndle INJECT INSULIN UNDER THE SKIN 4 TIMES DAILY DX  E11.29    cetirizine (ZYRTEC) 10 MG tablet Take 10 mg by mouth once daily.    docusate sodium (COLACE) 100 MG capsule Take 100 mg by mouth nightly.    donepeziL (ARICEPT) 5 MG tablet Take 5 mg by mouth every evening.    escitalopram oxalate (LEXAPRO) 5 MG Tab Take 5 mg by mouth once daily.    ferrous sulfate (FEOSOL) 325 mg (65 mg iron) Tab tablet Take 1 tablet by mouth 2 (two) times daily.    fluticasone propionate (FLONASE) 50 mcg/actuation nasal spray 1 spray by Each Nostril route daily as needed for Rhinitis.    gabapentin (NEURONTIN) 300 MG capsule Take 300 mg by mouth 2 (two) times daily.     gabapentin (NEURONTIN) 800 MG tablet Take 800 mg by mouth nightly.    HYDROcodone-acetaminophen (NORCO)  mg per tablet Take 1 tablet by mouth every 4 to 6 hours as needed.    insulin aspart U-100 (NOVOLOG) 100 unit/mL injection Inject into the skin 3 (three) times daily before meals. Per Sliding Scale   = 0 units  150-200 = 4 units  201-250 = 7 units  251-300 = 10 units  301-350 = 13 units  351-400 = " 16 units    levothyroxine (SYNTHROID) 150 MCG tablet Take 150 mcg by mouth once daily.    lidocaine-prilocaine (EMLA) cream Apply topically as needed. With Dialysis    metoclopramide HCl (REGLAN) 5 MG tablet Take 1 tablet by mouth 3 (three) times daily before meals.    morphine (MS CONTIN) 30 MG 12 hr tablet Take 1 tablet by mouth daily as needed. With dialysis on Tuesday, Thursday, and Saturday    multivitamin with minerals tablet Take 1 tablet by mouth once daily.    mycophenolate (CELLCEPT) 500 mg Tab Take by mouth 2 (two) times daily.    ONETOUCH ULTRA BLUE TEST STRIP Strp TEST BLOOD SUGAR 3 TIMES A DAY DX  E11.29    pantoprazole (PROTONIX) 40 MG tablet Take 40 mg by mouth once daily.    RHOPRESSA 0.02 % ophthalmic solution Place 1 drop into both eyes nightly.    tacrolimus (PROGRAF) 1 MG Cap Take 2 mg by mouth 2 (two) times a day.     Family History     Reviewed and not pertinent.        Tobacco Use    Smoking status: Former Smoker   Substance and Sexual Activity    Alcohol use: Not Currently    Drug use: Never    Sexual activity: Yes     Partners: Female     Review of Systems   Unable to perform ROS: Mental status change     Objective:     Vital Signs (Most Recent):  Temp: (!) 100.5 °F (38.1 °C) (09/05/20 1441)  Pulse: 80 (09/05/20 1830)  Resp: (!) 21 (09/05/20 1830)  BP: 124/62 (09/05/20 1830)  SpO2: 97 % (09/05/20 1830) Vital Signs (24h Range):  Temp:  [100.5 °F (38.1 °C)] 100.5 °F (38.1 °C)  Pulse:  [80-83] 80  Resp:  [17-24] 21  SpO2:  [89 %-98 %] 97 %  BP: (119-154)/(57-89) 124/62     Weight: 116.8 kg (257 lb 9.6 oz)  Body mass index is 39.17 kg/m².    Physical Exam  Vitals signs reviewed.   Constitutional:       General: He is not in acute distress.     Appearance: Normal appearance. He is obese. He is ill-appearing. He is not toxic-appearing or diaphoretic.      Comments: Drowsy but arousable     HENT:      Head: Normocephalic and atraumatic.      Nose: Nose normal. No congestion.       Mouth/Throat:      Mouth: Mucous membranes are moist.   Eyes:      General: No scleral icterus.     Pupils: Pupils are equal, round, and reactive to light.   Neck:      Musculoskeletal: Normal range of motion and neck supple. No muscular tenderness.   Cardiovascular:      Rate and Rhythm: Normal rate and regular rhythm.      Pulses:           Dorsalis pedis pulses are 1+ on the right side and 1+ on the left side.      Heart sounds: Normal heart sounds.      Comments: LCW pacemaker present  Pulmonary:      Effort: Pulmonary effort is normal.      Breath sounds: Normal breath sounds.   Abdominal:      General: Abdomen is flat. Bowel sounds are normal.      Palpations: Abdomen is soft.   Musculoskeletal: Normal range of motion.      Right lower le+ Edema (chronic per wife) present.      Left lower le+ Edema (chronic per wife) present.   Skin:     General: Skin is warm and dry.      Capillary Refill: Capillary refill takes less than 2 seconds.      Coloration: Skin is not jaundiced.      Findings: Erythema (warm, red, edematous to LUE) present. No bruising.      Comments: RUE AV fistula with (+) thrill/bruit  BLE coarse skin - chronic per wife   Neurological:      General: No focal deficit present.      Mental Status: He is easily aroused. He is lethargic and disoriented.      Motor: Weakness present.   Psychiatric:         Mood and Affect: Mood normal.         Behavior: Behavior normal.         Cognition and Memory: Cognition is impaired.                  Significant Labs:   Results for orders placed or performed during the hospital encounter of 20   CBC auto differential   Result Value Ref Range    WBC 14.64 (H) 3.90 - 12.70 K/uL    RBC 3.42 (L) 4.60 - 6.20 M/uL    Hemoglobin 10.6 (L) 14.0 - 18.0 g/dL    Hematocrit 33.8 (L) 40.0 - 54.0 %    Mean Corpuscular Volume 99 (H) 82 - 98 fL    Mean Corpuscular Hemoglobin 31.0 27.0 - 31.0 pg    Mean Corpuscular Hemoglobin Conc 31.4 (L) 32.0 - 36.0 g/dL    RDW 13.7  11.5 - 14.5 %    Platelets 123 (L) 150 - 350 K/uL    MPV 9.5 9.2 - 12.9 fL    Immature Granulocytes 0.5 0.0 - 0.5 %    Gran # (ANC) 12.6 (H) 1.8 - 7.7 K/uL    Immature Grans (Abs) 0.07 (H) 0.00 - 0.04 K/uL    Lymph # 0.9 (L) 1.0 - 4.8 K/uL    Mono # 1.1 (H) 0.3 - 1.0 K/uL    Eos # 0.0 0.0 - 0.5 K/uL    Baso # 0.02 0.00 - 0.20 K/uL    nRBC 0 0 /100 WBC    Gran% 85.8 (H) 38.0 - 73.0 %    Lymph% 6.0 (L) 18.0 - 48.0 %    Mono% 7.5 4.0 - 15.0 %    Eosinophil% 0.1 0.0 - 8.0 %    Basophil% 0.1 0.0 - 1.9 %    Differential Method Automated    Comprehensive metabolic panel   Result Value Ref Range    Sodium 136 136 - 145 mmol/L    Potassium 5.4 (H) 3.5 - 5.1 mmol/L    Chloride 106 95 - 110 mmol/L    CO2 19 (L) 23 - 29 mmol/L    Glucose 165 (H) 70 - 110 mg/dL    BUN, Bld 46 (H) 8 - 23 mg/dL    Creatinine 4.7 (H) 0.5 - 1.4 mg/dL    Calcium 9.2 8.7 - 10.5 mg/dL    Total Protein 7.4 6.0 - 8.4 g/dL    Albumin 3.5 3.5 - 5.2 g/dL    Total Bilirubin 0.8 0.1 - 1.0 mg/dL    Alkaline Phosphatase 78 55 - 135 U/L    AST 17 10 - 40 U/L    ALT 9 (L) 10 - 44 U/L    Anion Gap 11 8 - 16 mmol/L    eGFR if African American 13 (A) >60 mL/min/1.73 m^2    eGFR if non African American 11 (A) >60 mL/min/1.73 m^2   Lactic Acid, Plasma   Result Value Ref Range    Lactate (Lactic Acid) 1.0 0.5 - 2.2 mmol/L   Brain Natriuretic Peptide   Result Value Ref Range    BNP 1,030 (H) 0 - 99 pg/mL   Troponin I   Result Value Ref Range    Troponin I 0.103 (H) 0.000 - 0.026 ng/mL   Procalcitonin   Result Value Ref Range    Procalcitonin 0.37 (H) <0.25 ng/mL   COVID-19 Rapid Screening   Result Value Ref Range    SARS-CoV-2 RNA, Amplification, Qual Negative Negative         Significant Imaging:   Imaging Results          X-Ray Chest 1 View (Final result)  Result time 09/05/20 17:23:04    Final result by Lopez Burrell MD (09/05/20 17:23:04)                 Impression:      See above.      Electronically signed by: Lopez Burrell MD  Date:    09/05/2020  Time:    17:23              Narrative:    EXAMINATION:  XR CHEST 1 VIEW    CLINICAL HISTORY:  Abdominal pain.    FINDINGS:  No prior study.  Cardiomegaly status post CABG.  Right chest wall pacemaker with intact lead.  Right chest wall MediPort with catheter tip right atrium.  Pulmonary vasculature appears congested.  Bibasilar pleuroparenchymal bands with intermixed patchy opacities.  Left greater than right costophrenic angle blunting, pleural thickening versus minimal pleural fluid.                               CT Abdomen Pelvis  Without Contrast (Final result)  Result time 09/05/20 17:01:20    Final result by Lopez Burrell MD (09/05/20 17:01:20)                 Impression:      1. There is no CT evidence of an acute intra-abdominal inflammatory process with history of epigastric pain.  2. Subtle nodular liver surface contour.  Is there history of cirrhosis?  No ascites.  Splenomegaly, 15 cm.  3. No bowel obstruction.  There is stool filling the descending, sigmoid, and rectum.  Correlate for constipation/impaction.  4. Marked bilateral renal atrophy.  5. Marked atherosclerosis.  6. Mild cardiomegaly.  7. Prominent gynecomastia.  All CT scans at this facility are performed  using dose modulation techniques as appropriate to performed exam including the following:  automated exposure control; adjustment of mA and/or kV according to the patients size (this includes techniques or standardized protocols for targeted exams where dose is matched to indication/reason for exam: i.e. extremities or head);  iterative reconstruction technique.      Electronically signed by: Lopez Burrell MD  Date:    09/05/2020  Time:    17:01             Narrative:    EXAMINATION:  CT ABDOMEN PELVIS WITHOUT CONTRAST    CLINICAL HISTORY:  Epigastric pain;    TECHNIQUE:  Abdominal and pelvic CT performed without contrast.  Coronal and sagittal reformations.    COMPARISON:  None    FINDINGS:  Abdominal CT.  Mild cardiomegaly.  There is marked bilateral  gynecomastia.  Bibasilar thick pleuroparenchymal bands of atelectasis or scarring.  Superimposed active infiltrate not excluded particularly at the left lung base with patchy consolidation.    There is mild splenomegaly, 15 x 13 x 5 cm.  Subtle nodular surface contour liver.  Is there history of cirrhosis?  Otherwise, noncontrast evaluation liver, spleen, pancreas, and adrenal glands unremarkable.  There is marked bilateral renal cortical atrophy.  No hydronephrosis.  Bilateral renal artery calcifications.    Normal caliber aorta.  Extensive atherosclerosis aortic, SMA, celiac, normal gallbladder.  No bile duct dilatation.    No bowel obstruction.  The appendix is normal.  There is short segment narrowing of the distal transverse colon, probable contraction without discrete masslike wall thickening.    Degenerative spine.    Focal ventral abdominal wall eventration at the umbilicus without a significant hernia.    Pelvic CT.  The pelvic ring is intact.    Stool-filled descending, sigmoid, and rectum.  Correlate for constipation/impaction.  Pelvic bowel loops are otherwise unremarkable.    Ureters and urinary bladder are unremarkable.  No pelvic mass, free fluid, or lymphadenopathy.  Normal sized prostate gland.  Atherosclerosis.                               CT Head Without Contrast (Final result)  Result time 09/05/20 16:36:20    Final result by Lopez Burrell MD (09/05/20 16:36:20)                 Impression:      Unremarkable head CT for age.    All CT scans at this facility are performed  using dose modulation techniques as appropriate to performed exam including the following:  automated exposure control; adjustment of mA and/or kV according to the patients size (this includes techniques or standardized protocols for targeted exams where dose is matched to indication/reason for exam: i.e. extremities or head);  iterative reconstruction technique.      Electronically signed by: Lopez Burrell  MD  Date:    09/05/2020  Time:    16:36             Narrative:    EXAMINATION:  CT HEAD WITHOUT CONTRAST    CLINICAL HISTORY:  transient alteration of awareness, unspecified    TECHNIQUE:  Routine noncontrast head CT    COMPARISON:  None    FINDINGS:  There is patient motion artifact with subsequent repeat scan with continued beam hardening/motion artifact at the skull base.  There is no evidence of an acute intracranial hemorrhage or abnormal extra-axial fluid collection.  There is age-appropriate moderate cerebral atrophy with ventricular-sulcal congruence.  There is no abnormal increased or decreased density within the brain parenchyma.  Gray-white differentiation preserved.  There is no intracranial mass or mass effect.  The calvarium is intact.  Visualized paranasal sinuses and mastoids are well aerated.                                  Assessment/Plan:     * L arm cellulitis  Recurrent per pt's wife  BC x2 pending  Continue IV vancomycin, zosyn for now  Tylenol prn for fever    Encephalopathy, metabolic  Likely d/t fever/infection in combination with missed HD  Neuro checks with VS  Fall precautions  Continue IV abx  Tylenol prn fever  Holding home pain medications for now        Elevated troponin  Likely r/t renal failure, baseline unknown - EKG unrevealing, no chest pain  Trend levels  EKG prn        ESRD (end stage renal disease) on dialysis  TTS schedule - missed today  Nephrology consulted for HD management  Monitor labs closely  Avoid fluid overload  ADA, renal, cardiac diet  Accurate I&Os  Daily weights    Hyperkalemia  Likely d/t missed HD today  K+ 5.4 in ED  Kayexalate x2 doses ordered  Monitor levels and treat as appropriate  BMP q4 hours for now  Follow nephrology recs      Heart transplant recipient  Transplant in 2012 - ok to remain here per NO  Continue prograf, hold cellcept per NO-transplant center recs  Continuous cardiac monitoring    HLD (hyperlipidemia)  Continue home statin      Type 2  diabetes mellitus, with long-term current use of insulin   in ED  A1c in AM  Accuchecks with SSI prn  ADA, renal, cardiac diet    Hypothyroidism  Continue home synthroid        VTE Risk Mitigation (From admission, onward)         Ordered     Place sequential compression device  Until discontinued      09/05/20 1915                   Yomaira Mccall NP  Department of Hospital Medicine   [facility]

## 2020-09-06 NOTE — PROGRESS NOTES
Therapy with vancomycin complete and/or consult discontinued by provider.  Pharmacy will sign off, please re-consult as needed.    Thank you for allowing us to participate in this patient's care.    Mady Lazo, PharmD 9/6/2020 12:21 PM

## 2020-09-06 NOTE — ASSESSMENT & PLAN NOTE
Likely d/t fever/infection in combination with missed HD  Neuro checks with VS  Fall precautions  Continue IV abx  Tylenol prn fever  Holding home pain medications for now

## 2020-09-06 NOTE — ED NOTES
Acknowledge transfer of care of patient. Patient resting in bed with no acute distress noted. Alert and oriented to self and follows commands. Respirations easy and unlabored. IV site clear and patent.  Updated on plan of care to family . Cart in low position, side rails up x 2 and call bell in reach. Will continue to monitor

## 2020-09-06 NOTE — ASSESSMENT & PLAN NOTE
Recurrent per pt's wife  BC x2 pending  Continue IV vancomycin, zosyn for now  Tylenol prn for fever

## 2020-09-06 NOTE — ASSESSMENT & PLAN NOTE
Transplant in 2012 - ok to remain here per NO  Continue prograf, hold cellcept per NO-transplant center recs  Continuous cardiac monitoring

## 2020-09-06 NOTE — ASSESSMENT & PLAN NOTE
TTS schedule - missed today  Nephrology consulted for HD management  Monitor labs closely  Avoid fluid overload  ADA, renal, cardiac diet  Accurate I&Os  Daily weights

## 2020-09-06 NOTE — ASSESSMENT & PLAN NOTE
Likely d/t missed HD today  K+ 5.4 in ED  Kayexalate x2 doses ordered  Monitor levels and treat as appropriate  BMP q4 hours for now  Follow nephrology recs

## 2020-09-06 NOTE — ASSESSMENT & PLAN NOTE
ESRD pt, on HD x 2 years. q [place] HD [place] [facility]  Displaced from [place]  Missed HD yesterday  Pulmonary edema  Hyperkalemia: s/p kayexalate, K normal this am  Providing HD this am.  Discussed with HD nurse
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
## 2020-09-06 NOTE — HOSPITAL COURSE
Patient was kept overnight on OBS for R arm cellulitis. He was stared on IV vancomycin and IV zosyn. Nephrology was consulted and pt is to have HD today since he missed tx on Saturday.   WBC decreased from 14K to 8K, and arm is less red.  AMS has resolved and spouse reports he is back at baseline. Patient scheduled to dc home after HD today.   09/07:  Patient with 1BC returned Gram + cocci resembling strep during HD yesterday evening. DC cancelled. On cephalexin and IV vancomycin ordered by night NP. Redrawing BC today. Possible contamination - will follow sensitivities. 09/08: Discussed with ID - BC grew Stephani Quispe. Repeat BC with NGTD X 1 day - will need 2 weeks IV vancomycin after HD and f/u with PCP. Patient to have HD today then f/u with [facility]. Discussed with dr. Lacy, who will notify dialysis unit to give IV vancomycin. Discussed with pt and spouse, who are happy to be going home today.

## 2020-09-06 NOTE — SUBJECTIVE & OBJECTIVE
Past Medical History:   Diagnosis Date    Anemia     Cellulitis     Depression     Diabetes mellitus     ESRD (end stage renal disease) on dialysis     GERD (gastroesophageal reflux disease)     Heart transplanted 2012    Hiatal hernia     HLD (hyperlipidemia)     Hypothyroidism     Merkel cell carcinoma     Renal disorder     on dialysis    Seasonal allergies        Past Surgical History:   Procedure Laterality Date    AV FISTULA PLACEMENT Right     CORONARY ARTERY BYPASS GRAFT      HEART TRANSPLANT  2012    HEART TRANSPLANT      INSERTION OF PACEMAKER      LEFT VENTRICULAR ASSIST DEVICE      MEDIPORT INSERTION, SINGLE         Review of patient's allergies indicates:  No Known Allergies  Current Facility-Administered Medications   Medication Frequency    acetaminophen tablet 650 mg Q6H PRN    atorvastatin tablet 20 mg Daily    calcium gluconate 1g in dextrose 5% 100mL (ready to mix system) Q10 Min PRN    cetirizine tablet 10 mg Daily    dextrose 50% injection 12.5 g PRN    dextrose 50% injection 25 g PRN    dextrose 50% injection 25 g PRN    docusate sodium capsule 100 mg Nightly    donepeziL tablet 5 mg QHS    escitalopram oxalate tablet 5 mg Daily    ferrous sulfate EC tablet 325 mg Daily    fluticasone propionate 50 mcg/actuation nasal spray 50 mcg Daily PRN    gabapentin capsule 300 mg BID    glucagon (human recombinant) injection 1 mg PRN    glucose chewable tablet 16 g PRN    glucose chewable tablet 24 g PRN    insulin aspart U-100 pen 1-10 Units QID (AC + HS) PRN    levothyroxine tablet 150 mcg Daily    metoclopramide HCl tablet 5 mg TID AC    multivitamin tablet Daily    mycophenolate tablet 500 mg BID    netarsudiL 0.02 % ophthalmic solution 1 drop Nightly    pantoprazole EC tablet 40 mg Daily    piperacillin-tazobactam 4.5 g in dextrose 5 % 100 mL IVPB (ready to mix system) Q12H    tacrolimus capsule 2 mg BID    vancomycin - pharmacy to dose pharmacy to manage  frequency     Family History     None        Tobacco Use    Smoking status: Former Smoker   Substance and Sexual Activity    Alcohol use: Not Currently    Drug use: Never    Sexual activity: Yes     Partners: Female     Review of Systems   Constitutional: Positive for fever.   HENT: Negative.    Respiratory: Negative.    Gastrointestinal: Negative.    Genitourinary: Negative.    Musculoskeletal: Negative.    Neurological: Negative.    Psychiatric/Behavioral: Negative.      Objective:     Vital Signs (Most Recent):  Temp: 98.1 °F (36.7 °C) (09/06/20 1201)  Pulse: 80 (09/06/20 1201)  Resp: 18 (09/06/20 1201)  BP: (!) 122/57 (09/06/20 1201)  SpO2: 95 % (09/06/20 1201)  O2 Device (Oxygen Therapy): nasal cannula (09/06/20 0740) Vital Signs (24h Range):  Temp:  [97.6 °F (36.4 °C)-100.5 °F (38.1 °C)] 98.1 °F (36.7 °C)  Pulse:  [79-86] 80  Resp:  [17-24] 18  SpO2:  [89 %-100 %] 95 %  BP: (104-161)/(52-89) 122/57     Weight: 113 kg (249 lb 1.9 oz) (09/06/20 0600)  Body mass index is 37.88 kg/m².  Body surface area is 2.33 meters squared.    I/O last 3 completed shifts:  In: 1180 [P.O.:530; I.V.:550; IV Piggyback:100]  Out: 450 [Urine:450]    Physical Exam  Vitals signs and nursing note reviewed.   Constitutional:       Appearance: Normal appearance.   HENT:      Head: Normocephalic and atraumatic.   Cardiovascular:      Rate and Rhythm: Normal rate and regular rhythm.      Pulses: Normal pulses.      Heart sounds: Normal heart sounds.   Pulmonary:      Effort: Pulmonary effort is normal.      Breath sounds: Normal breath sounds.   Abdominal:      General: Abdomen is flat.      Palpations: Abdomen is soft.   Musculoskeletal:      Right lower leg: No edema.      Left lower leg: No edema.      Comments: L UE +erythema, edema   Skin:     General: Skin is warm and dry.   Neurological:      General: No focal deficit present.      Mental Status: He is alert and oriented to person, place, and time.   Psychiatric:         Mood  and Affect: Mood normal.         Behavior: Behavior normal.         Significant Labs: reviewed  BMP  Lab Results   Component Value Date     09/06/2020    K 4.7 09/06/2020    K 4.7 09/06/2020     09/06/2020    CO2 19 (L) 09/06/2020    BUN 49 (H) 09/06/2020    CREATININE 4.6 (H) 09/06/2020    CALCIUM 8.9 09/06/2020    ANIONGAP 12 09/06/2020    ESTGFRAFRICA 13 (A) 09/06/2020    EGFRNONAA 12 (A) 09/06/2020     Lab Results   Component Value Date    WBC 8.37 09/06/2020    HGB 9.6 (L) 09/06/2020    HCT 31.0 (L) 09/06/2020     (H) 09/06/2020     (L) 09/06/2020     Blood cx neg x 2    Significant Imaging: reviewed CXR: +pulmonary edema
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
## 2020-09-06 NOTE — PROGRESS NOTES
Pharmacokinetic Initial Assessment: IV Vancomycin    Assessment/Plan:    Initiate intravenous vancomycin with loading dose of 2000 mg once with subsequent doses when random concentrations are less than 25 mcg/mL  Desired empiric serum trough concentration is 10 to 15 mcg/mL  Draw vancomycin random level on 9/8 at 0600. Pre-HD random level.   Pharmacy will continue to follow and monitor vancomycin.      Please contact pharmacy at extension [phone] with any questions regarding this assessment.     Thank you for the consult,   Ck Almeida       Patient brief summary:  Rafael Doe is a 75 y.o. male initiated on antimicrobial therapy with IV Vancomycin for treatment of suspected skin & soft tissue infection    Drug Allergies:   Review of patient's allergies indicates:  No Known Allergies    Actual Body Weight:   116.8 kg    Renal Function:   Estimated Creatinine Clearance: 16.6 mL/min (A) (based on SCr of 4.7 mg/dL (H)).,     Dialysis Method (if applicable):  intermittent HD, TTS    CBC (last 72 hours):  Recent Labs   Lab Result Units 09/05/20  1600   WBC K/uL 14.64*   Hemoglobin g/dL 10.6*   Hematocrit % 33.8*   Platelets K/uL 123*   Gran% % 85.8*   Lymph% % 6.0*   Mono% % 7.5   Eosinophil% % 0.1   Basophil% % 0.1   Differential Method  Automated       Metabolic Panel (last 72 hours):  Recent Labs   Lab Result Units 09/05/20  1600 09/05/20  2101   Sodium mmol/L 136 136   Potassium mmol/L 5.4* 6.1*  6.1*   Chloride mmol/L 106 109   CO2 mmol/L 19* 18*   Glucose mg/dL 165* 164*   BUN, Bld mg/dL 46* 49*   Creatinine mg/dL 4.7* 4.7*   Albumin g/dL 3.5  --    Total Bilirubin mg/dL 0.8  --    Alkaline Phosphatase U/L 78  --    AST U/L 17  --    ALT U/L 9*  --        Drug levels (last 3 results):  No results for input(s): VANCOMYCINRA, VANCOMYCINPE, VANCOMYCINTR in the last 72 hours.    Microbiologic Results:  Microbiology Results (last 7 days)       Procedure Component Value Units Date/Time    Blood culture [[id number]]  Collected: 09/05/20 1600    Order Status: Sent Specimen: Blood from Peripheral, Antecubital, Left Updated: 09/05/20 2209    Blood culture [[id number]] Collected: 09/05/20 1650    Order Status: Sent Specimen: Blood from Peripheral, Hand, Left Updated: 09/05/20 1704

## 2020-09-06 NOTE — ED NOTES
Attempted to give report to Telemetry unit, Nurse unavailable and states that AVA Odell will call back to take report.

## 2020-09-06 NOTE — PLAN OF CARE
Patient is oriented X4 with intermittent confusion where oriented  to self only. VSS  Patient remained afebrile throughout shift.  Patient remained free of falls this shift  Patient free of pain this shift.  AC/HS blood glucose monitored, corrected via SSI.  Plan of care reviewed with pt and spouse  Sp[ose verbalized understanding.  Patient moving/turning with X2 assist.  Frequent weight shifting encouraged.  Patient atrial-paced on monitor  Bed low, side rails up x2, wheels locked, call light in reach.  Bed alarm maintained for safety.  Patient instructed to call for assistance.  Hourly rounding completed.  Will continue to monitor.      Problem: Fall Injury Risk  Goal: Absence of Fall and Fall-Related Injury  Outcome: Ongoing, Progressing  Intervention: Identify and Manage Contributors to Fall Injury Risk  Flowsheets (Taken 9/6/2020 0527)  Medication Review/Management: medications reviewed     Problem: Adult Inpatient Plan of Care  Goal: Plan of Care Review  Outcome: Ongoing, Progressing  Goal: Patient-Specific Goal (Individualization)  Outcome: Ongoing, Progressing  Goal: Absence of Hospital-Acquired Illness or Injury  Outcome: Ongoing, Progressing  Intervention: Identify and Manage Fall Risk  Flowsheets (Taken 9/6/2020 0527)  Safety Promotion/Fall Prevention:   bed alarm set   assistive device/personal item within reach   side rails raised x 3   Fall Risk reviewed with patient/family  Goal: Optimal Comfort and Wellbeing  Outcome: Ongoing, Progressing  Intervention: Provide Person-Centered Care  Flowsheets (Taken 9/6/2020 0527)  Trust Relationship/Rapport: care explained  Goal: Readiness for Transition of Care  Outcome: Ongoing, Progressing  Goal: Rounds/Family Conference  Outcome: Ongoing, Progressing     Problem: Diabetes Comorbidity  Goal: Blood Glucose Level Within Desired Range  Outcome: Ongoing, Progressing  Intervention: Maintain Glycemic Control  Flowsheets (Taken 9/6/2020 0527)  Glycemic Management:  blood glucose monitoring     Problem: Infection  Goal: Infection Symptom Resolution  Outcome: Ongoing, Progressing  Intervention: Prevent or Manage Infection  Flowsheets (Taken 9/6/2020 0527)  Fever Reduction/Comfort Measures:   lightweight bedding   lightweight clothing     Problem: Skin Injury Risk Increased  Goal: Skin Health and Integrity  Outcome: Ongoing, Progressing  Intervention: Optimize Skin Protection  Flowsheets (Taken 9/6/2020 0527)  Pressure Reduction Techniques:   frequent weight shift encouraged   weight shift assistance provided   pressure points protected  Head of Bed (HOB): HOB elevated

## 2020-09-06 NOTE — ASSESSMENT & PLAN NOTE
Likely r/t renal failure, baseline unknown - EKG unrevealing, no chest pain  Trend levels  EKG prn

## 2020-09-06 NOTE — SUBJECTIVE & OBJECTIVE
"Past Medical History:   Diagnosis Date    Anemia     Cellulitis     Depression     Diabetes mellitus     ESRD (end stage renal disease) on dialysis     GERD (gastroesophageal reflux disease)     Heart transplanted 2012    Hiatal hernia     HLD (hyperlipidemia)     Hypothyroidism     Merkel cell carcinoma     Renal disorder     on dialysis    Seasonal allergies        Past Surgical History:   Procedure Laterality Date    AV FISTULA PLACEMENT Right     CORONARY ARTERY BYPASS GRAFT      HEART TRANSPLANT  2012    HEART TRANSPLANT      INSERTION OF PACEMAKER      LEFT VENTRICULAR ASSIST DEVICE      MEDIPORT INSERTION, SINGLE         Review of patient's allergies indicates:  No Known Allergies    No current facility-administered medications on file prior to encounter.      Current Outpatient Medications on File Prior to Encounter   Medication Sig    atorvastatin (LIPITOR) 20 MG tablet Take 20 mg by mouth once daily.    BD ULTRA-FINE MINI PEN NEEDLE 31 gauge x 3/16" Ndle INJECT INSULIN UNDER THE SKIN 4 TIMES DAILY DX  E11.29    cetirizine (ZYRTEC) 10 MG tablet Take 10 mg by mouth once daily.    docusate sodium (COLACE) 100 MG capsule Take 100 mg by mouth nightly.    donepeziL (ARICEPT) 5 MG tablet Take 5 mg by mouth every evening.    escitalopram oxalate (LEXAPRO) 5 MG Tab Take 5 mg by mouth once daily.    ferrous sulfate (FEOSOL) 325 mg (65 mg iron) Tab tablet Take 1 tablet by mouth 2 (two) times daily.    fluticasone propionate (FLONASE) 50 mcg/actuation nasal spray 1 spray by Each Nostril route daily as needed for Rhinitis.    gabapentin (NEURONTIN) 300 MG capsule Take 300 mg by mouth 2 (two) times daily.     gabapentin (NEURONTIN) 800 MG tablet Take 800 mg by mouth nightly.    HYDROcodone-acetaminophen (NORCO)  mg per tablet Take 1 tablet by mouth every 4 to 6 hours as needed.    insulin aspart U-100 (NOVOLOG) 100 unit/mL injection Inject into the skin 3 (three) times daily before " meals. Per Sliding Scale   = 0 units  150-200 = 4 units  201-250 = 7 units  251-300 = 10 units  301-350 = 13 units  351-400 = 16 units    levothyroxine (SYNTHROID) 150 MCG tablet Take 150 mcg by mouth once daily.    lidocaine-prilocaine (EMLA) cream Apply topically as needed. With Dialysis    metoclopramide HCl (REGLAN) 5 MG tablet Take 1 tablet by mouth 3 (three) times daily before meals.    morphine (MS CONTIN) 30 MG 12 hr tablet Take 1 tablet by mouth daily as needed. With dialysis on Tuesday, Thursday, and Saturday    multivitamin with minerals tablet Take 1 tablet by mouth once daily.    mycophenolate (CELLCEPT) 500 mg Tab Take by mouth 2 (two) times daily.    ONETOUCH ULTRA BLUE TEST STRIP Strp TEST BLOOD SUGAR 3 TIMES A DAY DX  E11.29    pantoprazole (PROTONIX) 40 MG tablet Take 40 mg by mouth once daily.    RHOPRESSA 0.02 % ophthalmic solution Place 1 drop into both eyes nightly.    tacrolimus (PROGRAF) 1 MG Cap Take 2 mg by mouth 2 (two) times a day.     Family History     Reviewed and not pertinent.        Tobacco Use    Smoking status: Former Smoker   Substance and Sexual Activity    Alcohol use: Not Currently    Drug use: Never    Sexual activity: Yes     Partners: Female     Review of Systems   Unable to perform ROS: Mental status change     Objective:     Vital Signs (Most Recent):  Temp: (!) 100.5 °F (38.1 °C) (09/05/20 1441)  Pulse: 80 (09/05/20 1830)  Resp: (!) 21 (09/05/20 1830)  BP: 124/62 (09/05/20 1830)  SpO2: 97 % (09/05/20 1830) Vital Signs (24h Range):  Temp:  [100.5 °F (38.1 °C)] 100.5 °F (38.1 °C)  Pulse:  [80-83] 80  Resp:  [17-24] 21  SpO2:  [89 %-98 %] 97 %  BP: (119-154)/(57-89) 124/62     Weight: 116.8 kg (257 lb 9.6 oz)  Body mass index is 39.17 kg/m².    Physical Exam  Vitals signs reviewed.   Constitutional:       General: He is not in acute distress.     Appearance: Normal appearance. He is obese. He is ill-appearing. He is not toxic-appearing or diaphoretic.       Comments: Drowsy but arousable     HENT:      Head: Normocephalic and atraumatic.      Nose: Nose normal. No congestion.      Mouth/Throat:      Mouth: Mucous membranes are moist.   Eyes:      General: No scleral icterus.     Pupils: Pupils are equal, round, and reactive to light.   Neck:      Musculoskeletal: Normal range of motion and neck supple. No muscular tenderness.   Cardiovascular:      Rate and Rhythm: Normal rate and regular rhythm.      Pulses:           Dorsalis pedis pulses are 1+ on the right side and 1+ on the left side.      Heart sounds: Normal heart sounds.      Comments: LCW pacemaker present  Pulmonary:      Effort: Pulmonary effort is normal.      Breath sounds: Normal breath sounds.   Abdominal:      General: Abdomen is flat. Bowel sounds are normal.      Palpations: Abdomen is soft.   Musculoskeletal: Normal range of motion.      Right lower le+ Edema (chronic per wife) present.      Left lower le+ Edema (chronic per wife) present.   Skin:     General: Skin is warm and dry.      Capillary Refill: Capillary refill takes less than 2 seconds.      Coloration: Skin is not jaundiced.      Findings: Erythema (warm, red, edematous to LUE) present. No bruising.      Comments: RUE AV fistula with (+) thrill/bruit  BLE coarse skin - chronic per wife   Neurological:      General: No focal deficit present.      Mental Status: He is easily aroused. He is lethargic and disoriented.      Motor: Weakness present.   Psychiatric:         Mood and Affect: Mood normal.         Behavior: Behavior normal.         Cognition and Memory: Cognition is impaired.                  Significant Labs:   Results for orders placed or performed during the hospital encounter of 20   CBC auto differential   Result Value Ref Range    WBC 14.64 (H) 3.90 - 12.70 K/uL    RBC 3.42 (L) 4.60 - 6.20 M/uL    Hemoglobin 10.6 (L) 14.0 - 18.0 g/dL    Hematocrit 33.8 (L) 40.0 - 54.0 %    Mean Corpuscular Volume 99 (H) 82 - 98  fL    Mean Corpuscular Hemoglobin 31.0 27.0 - 31.0 pg    Mean Corpuscular Hemoglobin Conc 31.4 (L) 32.0 - 36.0 g/dL    RDW 13.7 11.5 - 14.5 %    Platelets 123 (L) 150 - 350 K/uL    MPV 9.5 9.2 - 12.9 fL    Immature Granulocytes 0.5 0.0 - 0.5 %    Gran # (ANC) 12.6 (H) 1.8 - 7.7 K/uL    Immature Grans (Abs) 0.07 (H) 0.00 - 0.04 K/uL    Lymph # 0.9 (L) 1.0 - 4.8 K/uL    Mono # 1.1 (H) 0.3 - 1.0 K/uL    Eos # 0.0 0.0 - 0.5 K/uL    Baso # 0.02 0.00 - 0.20 K/uL    nRBC 0 0 /100 WBC    Gran% 85.8 (H) 38.0 - 73.0 %    Lymph% 6.0 (L) 18.0 - 48.0 %    Mono% 7.5 4.0 - 15.0 %    Eosinophil% 0.1 0.0 - 8.0 %    Basophil% 0.1 0.0 - 1.9 %    Differential Method Automated    Comprehensive metabolic panel   Result Value Ref Range    Sodium 136 136 - 145 mmol/L    Potassium 5.4 (H) 3.5 - 5.1 mmol/L    Chloride 106 95 - 110 mmol/L    CO2 19 (L) 23 - 29 mmol/L    Glucose 165 (H) 70 - 110 mg/dL    BUN, Bld 46 (H) 8 - 23 mg/dL    Creatinine 4.7 (H) 0.5 - 1.4 mg/dL    Calcium 9.2 8.7 - 10.5 mg/dL    Total Protein 7.4 6.0 - 8.4 g/dL    Albumin 3.5 3.5 - 5.2 g/dL    Total Bilirubin 0.8 0.1 - 1.0 mg/dL    Alkaline Phosphatase 78 55 - 135 U/L    AST 17 10 - 40 U/L    ALT 9 (L) 10 - 44 U/L    Anion Gap 11 8 - 16 mmol/L    eGFR if African American 13 (A) >60 mL/min/1.73 m^2    eGFR if non African American 11 (A) >60 mL/min/1.73 m^2   Lactic Acid, Plasma   Result Value Ref Range    Lactate (Lactic Acid) 1.0 0.5 - 2.2 mmol/L   Brain Natriuretic Peptide   Result Value Ref Range    BNP 1,030 (H) 0 - 99 pg/mL   Troponin I   Result Value Ref Range    Troponin I 0.103 (H) 0.000 - 0.026 ng/mL   Procalcitonin   Result Value Ref Range    Procalcitonin 0.37 (H) <0.25 ng/mL   COVID-19 Rapid Screening   Result Value Ref Range    SARS-CoV-2 RNA, Amplification, Qual Negative Negative         Significant Imaging:   Imaging Results          X-Ray Chest 1 View (Final result)  Result time 09/05/20 17:23:04    Final result by Lopez Burrell MD (09/05/20 17:23:04)                  Impression:      See above.      Electronically signed by: Lpoez Burrell MD  Date:    09/05/2020  Time:    17:23             Narrative:    EXAMINATION:  XR CHEST 1 VIEW    CLINICAL HISTORY:  Abdominal pain.    FINDINGS:  No prior study.  Cardiomegaly status post CABG.  Right chest wall pacemaker with intact lead.  Right chest wall MediPort with catheter tip right atrium.  Pulmonary vasculature appears congested.  Bibasilar pleuroparenchymal bands with intermixed patchy opacities.  Left greater than right costophrenic angle blunting, pleural thickening versus minimal pleural fluid.                               CT Abdomen Pelvis  Without Contrast (Final result)  Result time 09/05/20 17:01:20    Final result by Lopez Burrell MD (09/05/20 17:01:20)                 Impression:      1. There is no CT evidence of an acute intra-abdominal inflammatory process with history of epigastric pain.  2. Subtle nodular liver surface contour.  Is there history of cirrhosis?  No ascites.  Splenomegaly, 15 cm.  3. No bowel obstruction.  There is stool filling the descending, sigmoid, and rectum.  Correlate for constipation/impaction.  4. Marked bilateral renal atrophy.  5. Marked atherosclerosis.  6. Mild cardiomegaly.  7. Prominent gynecomastia.  All CT scans at this facility are performed  using dose modulation techniques as appropriate to performed exam including the following:  automated exposure control; adjustment of mA and/or kV according to the patients size (this includes techniques or standardized protocols for targeted exams where dose is matched to indication/reason for exam: i.e. extremities or head);  iterative reconstruction technique.      Electronically signed by: Lopez Burrell MD  Date:    09/05/2020  Time:    17:01             Narrative:    EXAMINATION:  CT ABDOMEN PELVIS WITHOUT CONTRAST    CLINICAL HISTORY:  Epigastric pain;    TECHNIQUE:  Abdominal and pelvic CT performed without contrast.   Coronal and sagittal reformations.    COMPARISON:  None    FINDINGS:  Abdominal CT.  Mild cardiomegaly.  There is marked bilateral gynecomastia.  Bibasilar thick pleuroparenchymal bands of atelectasis or scarring.  Superimposed active infiltrate not excluded particularly at the left lung base with patchy consolidation.    There is mild splenomegaly, 15 x 13 x 5 cm.  Subtle nodular surface contour liver.  Is there history of cirrhosis?  Otherwise, noncontrast evaluation liver, spleen, pancreas, and adrenal glands unremarkable.  There is marked bilateral renal cortical atrophy.  No hydronephrosis.  Bilateral renal artery calcifications.    Normal caliber aorta.  Extensive atherosclerosis aortic, SMA, celiac, normal gallbladder.  No bile duct dilatation.    No bowel obstruction.  The appendix is normal.  There is short segment narrowing of the distal transverse colon, probable contraction without discrete masslike wall thickening.    Degenerative spine.    Focal ventral abdominal wall eventration at the umbilicus without a significant hernia.    Pelvic CT.  The pelvic ring is intact.    Stool-filled descending, sigmoid, and rectum.  Correlate for constipation/impaction.  Pelvic bowel loops are otherwise unremarkable.    Ureters and urinary bladder are unremarkable.  No pelvic mass, free fluid, or lymphadenopathy.  Normal sized prostate gland.  Atherosclerosis.                               CT Head Without Contrast (Final result)  Result time 09/05/20 16:36:20    Final result by Lopez Burrell MD (09/05/20 16:36:20)                 Impression:      Unremarkable head CT for age.    All CT scans at this facility are performed  using dose modulation techniques as appropriate to performed exam including the following:  automated exposure control; adjustment of mA and/or kV according to the patients size (this includes techniques or standardized protocols for targeted exams where dose is matched to indication/reason for  exam: i.e. extremities or head);  iterative reconstruction technique.      Electronically signed by: Lopez Burrell MD  Date:    09/05/2020  Time:    16:36             Narrative:    EXAMINATION:  CT HEAD WITHOUT CONTRAST    CLINICAL HISTORY:  transient alteration of awareness, unspecified    TECHNIQUE:  Routine noncontrast head CT    COMPARISON:  None    FINDINGS:  There is patient motion artifact with subsequent repeat scan with continued beam hardening/motion artifact at the skull base.  There is no evidence of an acute intracranial hemorrhage or abnormal extra-axial fluid collection.  There is age-appropriate moderate cerebral atrophy with ventricular-sulcal congruence.  There is no abnormal increased or decreased density within the brain parenchyma.  Gray-white differentiation preserved.  There is no intracranial mass or mass effect.  The calvarium is intact.  Visualized paranasal sinuses and mastoids are well aerated.

## 2020-09-07 LAB
ANION GAP SERPL CALC-SCNC: 10 MMOL/L (ref 8–16)
ANION GAP SERPL CALC-SCNC: 11 MMOL/L (ref 8–16)
ANION GAP SERPL CALC-SCNC: 12 MMOL/L (ref 8–16)
ANION GAP SERPL CALC-SCNC: 13 MMOL/L (ref 8–16)
ANION GAP SERPL CALC-SCNC: 14 MMOL/L (ref 8–16)
BASOPHILS # BLD AUTO: 0.02 K/UL (ref 0–0.2)
BASOPHILS NFR BLD: 0.3 % (ref 0–1.9)
BUN SERPL-MCNC: 28 MG/DL (ref 8–23)
BUN SERPL-MCNC: 29 MG/DL (ref 8–23)
BUN SERPL-MCNC: 31 MG/DL (ref 8–23)
BUN SERPL-MCNC: 38 MG/DL (ref 8–23)
BUN SERPL-MCNC: 38 MG/DL (ref 8–23)
CALCIUM SERPL-MCNC: 8.3 MG/DL (ref 8.7–10.5)
CALCIUM SERPL-MCNC: 8.4 MG/DL (ref 8.7–10.5)
CALCIUM SERPL-MCNC: 8.5 MG/DL (ref 8.7–10.5)
CALCIUM SERPL-MCNC: 8.7 MG/DL (ref 8.7–10.5)
CALCIUM SERPL-MCNC: 8.7 MG/DL (ref 8.7–10.5)
CHLORIDE SERPL-SCNC: 100 MMOL/L (ref 95–110)
CHLORIDE SERPL-SCNC: 101 MMOL/L (ref 95–110)
CHLORIDE SERPL-SCNC: 101 MMOL/L (ref 95–110)
CHLORIDE SERPL-SCNC: 102 MMOL/L (ref 95–110)
CHLORIDE SERPL-SCNC: 103 MMOL/L (ref 95–110)
CO2 SERPL-SCNC: 23 MMOL/L (ref 23–29)
CO2 SERPL-SCNC: 24 MMOL/L (ref 23–29)
CO2 SERPL-SCNC: 26 MMOL/L (ref 23–29)
CO2 SERPL-SCNC: 26 MMOL/L (ref 23–29)
CO2 SERPL-SCNC: 27 MMOL/L (ref 23–29)
CREAT SERPL-MCNC: 3.5 MG/DL (ref 0.5–1.4)
CREAT SERPL-MCNC: 3.5 MG/DL (ref 0.5–1.4)
CREAT SERPL-MCNC: 3.6 MG/DL (ref 0.5–1.4)
CREAT SERPL-MCNC: 4.1 MG/DL (ref 0.5–1.4)
CREAT SERPL-MCNC: 4.1 MG/DL (ref 0.5–1.4)
DIFFERENTIAL METHOD: ABNORMAL
EOSINOPHIL # BLD AUTO: 0.2 K/UL (ref 0–0.5)
EOSINOPHIL NFR BLD: 2 % (ref 0–8)
ERYTHROCYTE [DISTWIDTH] IN BLOOD BY AUTOMATED COUNT: 13.6 % (ref 11.5–14.5)
EST. GFR  (AFRICAN AMERICAN): 15 ML/MIN/1.73 M^2
EST. GFR  (AFRICAN AMERICAN): 15 ML/MIN/1.73 M^2
EST. GFR  (AFRICAN AMERICAN): 18 ML/MIN/1.73 M^2
EST. GFR  (AFRICAN AMERICAN): 19 ML/MIN/1.73 M^2
EST. GFR  (AFRICAN AMERICAN): 19 ML/MIN/1.73 M^2
EST. GFR  (NON AFRICAN AMERICAN): 13 ML/MIN/1.73 M^2
EST. GFR  (NON AFRICAN AMERICAN): 13 ML/MIN/1.73 M^2
EST. GFR  (NON AFRICAN AMERICAN): 15 ML/MIN/1.73 M^2
EST. GFR  (NON AFRICAN AMERICAN): 16 ML/MIN/1.73 M^2
EST. GFR  (NON AFRICAN AMERICAN): 16 ML/MIN/1.73 M^2
GLUCOSE SERPL-MCNC: 131 MG/DL (ref 70–110)
GLUCOSE SERPL-MCNC: 142 MG/DL (ref 70–110)
GLUCOSE SERPL-MCNC: 153 MG/DL (ref 70–110)
GLUCOSE SERPL-MCNC: 159 MG/DL (ref 70–110)
GLUCOSE SERPL-MCNC: 164 MG/DL (ref 70–110)
HBV SURFACE AG SERPL QL IA: NEGATIVE
HCT VFR BLD AUTO: 36.9 % (ref 40–54)
HGB BLD-MCNC: 11.8 G/DL (ref 14–18)
IMM GRANULOCYTES # BLD AUTO: 0.03 K/UL (ref 0–0.04)
IMM GRANULOCYTES NFR BLD AUTO: 0.4 % (ref 0–0.5)
LYMPHOCYTES # BLD AUTO: 1 K/UL (ref 1–4.8)
LYMPHOCYTES NFR BLD: 12.4 % (ref 18–48)
MAGNESIUM SERPL-MCNC: 1.9 MG/DL (ref 1.6–2.6)
MCH RBC QN AUTO: 31.2 PG (ref 27–31)
MCHC RBC AUTO-ENTMCNC: 32 G/DL (ref 32–36)
MCV RBC AUTO: 98 FL (ref 82–98)
MONOCYTES # BLD AUTO: 0.8 K/UL (ref 0.3–1)
MONOCYTES NFR BLD: 10 % (ref 4–15)
NEUTROPHILS # BLD AUTO: 5.9 K/UL (ref 1.8–7.7)
NEUTROPHILS NFR BLD: 74.9 % (ref 38–73)
NRBC BLD-RTO: 0 /100 WBC
PLATELET # BLD AUTO: 122 K/UL (ref 150–350)
PLATELET BLD QL SMEAR: ABNORMAL
PMV BLD AUTO: 10.2 FL (ref 9.2–12.9)
POCT GLUCOSE: 132 MG/DL (ref 70–110)
POCT GLUCOSE: 137 MG/DL (ref 70–110)
POCT GLUCOSE: 161 MG/DL (ref 70–110)
POCT GLUCOSE: 168 MG/DL (ref 70–110)
POTASSIUM SERPL-SCNC: 3.9 MMOL/L (ref 3.5–5.1)
POTASSIUM SERPL-SCNC: 3.9 MMOL/L (ref 3.5–5.1)
POTASSIUM SERPL-SCNC: 4 MMOL/L (ref 3.5–5.1)
POTASSIUM SERPL-SCNC: 4 MMOL/L (ref 3.5–5.1)
POTASSIUM SERPL-SCNC: 4.2 MMOL/L (ref 3.5–5.1)
POTASSIUM SERPL-SCNC: 4.3 MMOL/L (ref 3.5–5.1)
POTASSIUM SERPL-SCNC: 4.3 MMOL/L (ref 3.5–5.1)
POTASSIUM SERPL-SCNC: 5.2 MMOL/L (ref 3.5–5.1)
POTASSIUM SERPL-SCNC: 5.2 MMOL/L (ref 3.5–5.1)
RBC # BLD AUTO: 3.78 M/UL (ref 4.6–6.2)
SODIUM SERPL-SCNC: 137 MMOL/L (ref 136–145)
SODIUM SERPL-SCNC: 138 MMOL/L (ref 136–145)
SODIUM SERPL-SCNC: 139 MMOL/L (ref 136–145)
SODIUM SERPL-SCNC: 139 MMOL/L (ref 136–145)
SODIUM SERPL-SCNC: 140 MMOL/L (ref 136–145)
WBC # BLD AUTO: 7.81 K/UL (ref 3.9–12.7)

## 2020-09-07 PROCEDURE — 99233 SBSQ HOSP IP/OBS HIGH 50: CPT | Mod: ,,, | Performed by: INTERNAL MEDICINE

## 2020-09-07 PROCEDURE — 25000003 PHARM REV CODE 250: Performed by: INTERNAL MEDICINE

## 2020-09-07 PROCEDURE — 63600175 PHARM REV CODE 636 W HCPCS: Performed by: INTERNAL MEDICINE

## 2020-09-07 PROCEDURE — 87186 SC STD MICRODIL/AGAR DIL: CPT

## 2020-09-07 PROCEDURE — 87040 BLOOD CULTURE FOR BACTERIA: CPT

## 2020-09-07 PROCEDURE — 27000221 HC OXYGEN, UP TO 24 HOURS

## 2020-09-07 PROCEDURE — 36415 COLL VENOUS BLD VENIPUNCTURE: CPT

## 2020-09-07 PROCEDURE — 25000003 PHARM REV CODE 250: Performed by: NURSE PRACTITIONER

## 2020-09-07 PROCEDURE — 94761 N-INVAS EAR/PLS OXIMETRY MLT: CPT

## 2020-09-07 PROCEDURE — 87077 CULTURE AEROBIC IDENTIFY: CPT | Mod: 59

## 2020-09-07 PROCEDURE — 85025 COMPLETE CBC W/AUTO DIFF WBC: CPT

## 2020-09-07 PROCEDURE — 21400001 HC TELEMETRY ROOM

## 2020-09-07 PROCEDURE — 83735 ASSAY OF MAGNESIUM: CPT

## 2020-09-07 PROCEDURE — 80048 BASIC METABOLIC PNL TOTAL CA: CPT | Mod: 91

## 2020-09-07 PROCEDURE — 63600175 PHARM REV CODE 636 W HCPCS: Performed by: NURSE PRACTITIONER

## 2020-09-07 PROCEDURE — 99233 PR SUBSEQUENT HOSPITAL CARE,LEVL III: ICD-10-PCS | Mod: ,,, | Performed by: INTERNAL MEDICINE

## 2020-09-07 RX ADMIN — TACROLIMUS 2 MG: 1 CAPSULE ORAL at 05:09

## 2020-09-07 RX ADMIN — FERROUS SULFATE TAB EC 325 MG (65 MG FE EQUIVALENT) 325 MG: 325 (65 FE) TABLET DELAYED RESPONSE at 08:09

## 2020-09-07 RX ADMIN — ATORVASTATIN CALCIUM 20 MG: 10 TABLET, FILM COATED ORAL at 08:09

## 2020-09-07 RX ADMIN — LEVOTHYROXINE SODIUM 150 MCG: 150 TABLET ORAL at 05:09

## 2020-09-07 RX ADMIN — DONEPEZIL HYDROCHLORIDE 5 MG: 5 TABLET, FILM COATED ORAL at 08:09

## 2020-09-07 RX ADMIN — DOCUSATE SODIUM 100 MG: 100 CAPSULE, LIQUID FILLED ORAL at 08:09

## 2020-09-07 RX ADMIN — TACROLIMUS 2 MG: 1 CAPSULE ORAL at 08:09

## 2020-09-07 RX ADMIN — CEFTRIAXONE 1 G: 1 INJECTION, SOLUTION INTRAVENOUS at 02:09

## 2020-09-07 RX ADMIN — GABAPENTIN 300 MG: 300 CAPSULE ORAL at 08:09

## 2020-09-07 RX ADMIN — MYCOPHENOLATE MOFETIL 500 MG: 500 TABLET ORAL at 08:09

## 2020-09-07 RX ADMIN — INSULIN ASPART 2 UNITS: 100 INJECTION, SOLUTION INTRAVENOUS; SUBCUTANEOUS at 11:09

## 2020-09-07 RX ADMIN — HYDROCODONE BITARTRATE AND ACETAMINOPHEN 1 TABLET: 10; 325 TABLET ORAL at 08:09

## 2020-09-07 RX ADMIN — METOCLOPRAMIDE HYDROCHLORIDE 5 MG: 5 TABLET ORAL at 10:09

## 2020-09-07 RX ADMIN — CETIRIZINE HYDROCHLORIDE 10 MG: 10 TABLET, FILM COATED ORAL at 08:09

## 2020-09-07 RX ADMIN — CLINDAMYCIN HYDROCHLORIDE 300 MG: 150 CAPSULE ORAL at 05:09

## 2020-09-07 RX ADMIN — METOCLOPRAMIDE HYDROCHLORIDE 5 MG: 5 TABLET ORAL at 05:09

## 2020-09-07 RX ADMIN — INSULIN ASPART 2 UNITS: 100 INJECTION, SOLUTION INTRAVENOUS; SUBCUTANEOUS at 04:09

## 2020-09-07 RX ADMIN — CEPHALEXIN 500 MG: 500 CAPSULE ORAL at 08:09

## 2020-09-07 RX ADMIN — THERA TABS 1 TABLET: TAB at 08:09

## 2020-09-07 RX ADMIN — ESCITALOPRAM OXALATE 5 MG: 5 TABLET, FILM COATED ORAL at 08:09

## 2020-09-07 RX ADMIN — PANTOPRAZOLE SODIUM 40 MG: 40 TABLET, DELAYED RELEASE ORAL at 08:09

## 2020-09-07 NOTE — PLAN OF CARE
SAMUEL met with pt and pt spouse at bedside to complete discharge assessment. Pt lives at home with his spouse and son. Pt help at home is his spouse. Pt Ambulatory and ADLs. Pt use Wheelchair and cane as needed. No needs identified at this time. SAMUEL provided a transitional care folder, information on advanced directives, information on pharmacy bedside delivery, and discharge planning begins on admission with contact information for any needs/questions. Pt board updated with CM name and Number.     Payor: /   PCP: Corby Perdomo MD  Pharmacy:   [id number] - [place], [place] - [address] AT [facility] [address]  [address]  [place] [address]  Phone: [phone] Fax: [phone]  Bedside Delivery: No  MyChart: Link sent       09/07/20 1188   Discharge Assessment   Assessment Type Discharge Planning Assessment   Confirmed/corrected address and phone number on facesheet? Yes   Assessment information obtained from? Caregiver  (Keyanna Doe ([place]) [phone])   Expected Length of Stay (days)   (TBD)   Communicated expected length of stay with patient/caregiver yes   Prior to hospitilization cognitive status: Alert/Oriented   Prior to hospitalization functional status: Independent;Assistive Equipment  (as needed)   Current cognitive status: Alert/Oriented   Current Functional Status: Independent;Assistive Equipment  (As needed)   Facility Arrived From: home   Lives With spouse;child(edouard), adult   Able to Return to Prior Arrangements yes   Is patient able to care for self after discharge? Yes   Who are your caregiver(s) and their phone number(s)? Keyanna Doe (Spouse) [phone]   Patient's perception of discharge disposition home or selfcare   Readmission Within the Last 30 Days no previous admission in last 30 days   Patient currently being followed by outpatient case management? No   Patient currently receives any other outside agency services? No   Equipment Currently Used at Home  wheelchair;cane, straight   Part D Coverage n/a   Do you have any problems affording any of your prescribed medications? No   Is the patient taking medications as prescribed? yes   Does the patient have transportation home? Yes   Transportation Anticipated family or friend will provide   Dialysis Name and Scheduled days n/a   Does the patient receive services at the Coumadin Clinic? No   Discharge Plan A Home with family   Discharge Plan B Home   DME Needed Upon Discharge  none   Patient/Family in Agreement with Plan yes       Neno Valero LMSW 9/7/2020 3:45 PM

## 2020-09-07 NOTE — SIGNIFICANT EVENT
Patient scheduled to to be discharged tonight after completion of HD. Received communication from micro lab in [place] of blood cultures with gram positive cocci in chains resembling strep. Discussed with Dr. Beavers - instructed to hold discharge for now and ensure pt gets a dose of vancomycin tonight after HD. Communicated with bedside nurse and charge nurse - orders given. Patient and family in agreement.

## 2020-09-07 NOTE — ASSESSMENT & PLAN NOTE
--nephrology following  --HD on T, Th, Sat  --ADA, renal, cardiac diet  --strict I&O  --daily weights

## 2020-09-07 NOTE — ASSESSMENT & PLAN NOTE
76 y/o male with ESRD presented with L UE cellulitis:     ESRD (end stage renal disease) on dialysis  ESRD pt, on HD x 2 years. q TTS HD at [facility]  Displaced from [facility]  Missed HD yesterday  Pulmonary edema  Hyperkalemia: s/p kayexalate, K normal this am  Providing HD this am.  Discussed with HD nurse     * L arm cellulitis  Presented with fever and leukocytosis  Blood cx neg  Good response to abx  Per wife, this is not the first episode of cellulitis in the same arm  Recommend aggressive abx approach to cover both GN's and GP's.     Heart transplant recipient  Immunosuppressed on prograf and cellcept  Doses were reviewed        Plans and recommendations:  As discussed above  Opportunity for questions provided

## 2020-09-07 NOTE — PROGRESS NOTES
Patient received 4 hrs of Dialysis, tolerated well. Net UF is 4000 ml. No access issues. Dr. Lacy visited during tx.

## 2020-09-07 NOTE — PROGRESS NOTES
Consult re-ordered when blood cultures resulted in Gram Stain, Gram+ Cocci in chains    Pharmacokinetic Initial Assessment: IV Vancomycin    Assessment/Plan:    The patient received a loading dose of Vancomycin 2 g on 9/6 @ 0247. After HD procedure the serum level was 12.7 mcg/mL @ 2231 this evening.     Patient will receive a dose this evening of Vancomycin  1000 mg once with subsequent doses when random concentrations are less than 25 mcg/mL  Desired empiric serum trough concentration is 15 to 20 mcg/mL  Draw vancomycin random level on 9/8 at 0600 as a pre-HD level.  Pharmacy will continue to follow and monitor vancomycin.      Please contact pharmacy at extension [phone] with any questions regarding this assessment.     Thank you for the consult,   Ck Almeida       Patient brief summary:  Rafael Doe is a 75 y.o. male initiated on antimicrobial therapy with IV Vancomycin for treatment of suspected bacteremia    Drug Allergies:   Review of patient's allergies indicates:  No Known Allergies    Actual Body Weight:   113 kg    Renal Function:   Estimated Creatinine Clearance: 26.4 mL/min (A) (based on SCr of 2.9 mg/dL (H)).,     Dialysis Method (if applicable):  intermittent HD, TTSat    CBC (last 72 hours):  Recent Labs   Lab Result Units 09/05/20  1600 09/06/20  0343   WBC K/uL 14.64* 8.37   Hemoglobin g/dL 10.6* 9.6*   Hemoglobin A1C %  --  6.2*   Hematocrit % 33.8* 31.0*   Platelets K/uL 123* 118*   Gran% % 85.8* 78.2*   Lymph% % 6.0* 10.9*   Mono% % 7.5 9.7   Eosinophil% % 0.1 0.5   Basophil% % 0.1 0.1   Differential Method  Automated Automated       Metabolic Panel (last 72 hours):  Recent Labs   Lab Result Units 09/05/20  1600 09/05/20  2101 09/06/20  0000 09/06/20  0343 09/06/20  0750 09/06/20  1132 09/06/20  1555 09/06/20  1954 09/06/20  2231   Sodium mmol/L 136 136 139 139  139 139 140 140 139 140   Potassium mmol/L 5.4* 6.1*  6.1* 6.6*  6.6* 5.4*  5.4*  5.4* 4.7  4.7 4.9  4.9 5.0  5.0 3.6  3.6  5.6*  5.6*   Chloride mmol/L 106 109 109 107  107 108 107 106 102 105   CO2 mmol/L 19* 18* 17* 17*  17* 19* 20* 21* 26 18*   Glucose mg/dL 165* 164* 163* 197*  197* 142* 156* 154* 132* 130*   BUN, Bld mg/dL 46* 49* 51* 50*  50* 49* 51* 53* 26* 22   Creatinine mg/dL 4.7* 4.7* 4.8* 4.7*  4.7* 4.6* 4.7* 4.9* 2.8* 2.9*   Albumin g/dL 3.5  --   --   --   --   --   --   --   --    Total Bilirubin mg/dL 0.8  --   --   --   --   --   --   --   --    Alkaline Phosphatase U/L 78  --   --   --   --   --   --   --   --    AST U/L 17  --   --   --   --   --   --   --   --    ALT U/L 9*  --   --   --   --   --   --   --   --    Magnesium mg/dL  --   --   --  2.1  --   --   --   --   --        Drug levels (last 3 results):  Recent Labs   Lab Result Units 09/06/20  2231   Vancomycin, Random ug/mL 12.7       Microbiologic Results:  Microbiology Results (last 7 days)       Procedure Component Value Units Date/Time    Blood culture [[id number]] Collected: 09/05/20 1650    Order Status: Completed Specimen: Blood from Peripheral, Hand, Left Updated: 09/06/20 2119     Blood Culture, Routine Gram stain peds bottle: Gram positive cocci in chains resembling Strep      Results called to and read back by: Lizbeth Garibay RN  09/06/2020        21:18    Blood culture [[id number]] Collected: 09/05/20 1600    Order Status: Completed Specimen: Blood from Peripheral, Antecubital, Left Updated: 09/06/20 0715     Blood Culture, Routine No Growth to date

## 2020-09-07 NOTE — ASSESSMENT & PLAN NOTE
--Transplant in 2012 - ok to remain here per [place]  --Continue prograf, hold cellcept per [place]-transplant center recs  --Continuous cardiac monitoring
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
## 2020-09-07 NOTE — NURSING
Patient is oriented X4. VSS.   Pt returned from dialysis, stable with no complaints.  Patient remained afebrile throughout shift.  Patient remained free of falls this shift  Patient free of pain this shift.  AC/HS blood glucose monitored, corrected via SSI.  Plan of care reviewed with pt and spouse  Pt verbalized understanding.  Patient walking with X1 assist.  Frequent weight shifting encouraged.  Patient atrial-paced on monitor  Bed low, side rails up x2, wheels locked, call light in reach.  Bed alarm maintained for safety.  Patient instructed to call for assistance.  Hourly rounding completed.  Will continue to monitor.       Problem: Fall Injury Risk  Goal: Absence of Fall and Fall-Related Injury  Outcome: Ongoing, Progressing  Intervention: Identify and Manage Contributors to Fall Injury Risk  Flowsheets (Taken 9/6/2020 0527)  Medication Review/Management: medications reviewed     Problem: Adult Inpatient Plan of Care  Goal: Plan of Care Review  Outcome: Ongoing, Progressing  Goal: Patient-Specific Goal (Individualization)  Outcome: Ongoing, Progressing  Goal: Absence of Hospital-Acquired Illness or Injury  Outcome: Ongoing, Progressing  Intervention: Identify and Manage Fall Risk  Flowsheets (Taken 9/6/2020 0527)  Safety Promotion/Fall Prevention:   bed alarm set   assistive device/personal item within reach   side rails raised x 3   Fall Risk reviewed with patient/family  Goal: Optimal Comfort and Wellbeing  Outcome: Ongoing, Progressing  Intervention: Provide Person-Centered Care  Flowsheets (Taken 9/6/2020 0527)  Trust Relationship/Rapport: care explained  Goal: Readiness for Transition of Care  Outcome: Ongoing, Progressing  Goal: Rounds/Family Conference  Outcome: Ongoing, Progressing     Problem: Diabetes Comorbidity  Goal: Blood Glucose Level Within Desired Range  Outcome: Ongoing, Progressing  Intervention: Maintain Glycemic Control  Flowsheets (Taken 9/6/2020 0527)  Glycemic Management: blood  glucose monitoring     Problem: Infection  Goal: Infection Symptom Resolution  Outcome: Ongoing, Progressing  Intervention: Prevent or Manage Infection  Flowsheets (Taken 9/6/2020 0527)  Fever Reduction/Comfort Measures:   lightweight bedding   lightweight clothing     Problem: Skin Injury Risk Increased  Goal: Skin Health and Integrity  Outcome: Ongoing, Progressing  Intervention: Optimize Skin Protection  Flowsheets (Taken 9/6/2020 0527)  Pressure Reduction Techniques:   frequent weight shift encouraged   weight shift assistance provided   pressure points protected  Head of Bed (HOB): HOB elevated

## 2020-09-07 NOTE — ASSESSMENT & PLAN NOTE
Recurrent per pt's wife  BC x2 pending  Continue IV vancomycin, zosyn for now  Tylenol prn for fever  09/07:  --BC + strep gordinni  --ID consulted  --WBC improving  --IV rocephin, oral bactrim (per Nephrology)

## 2020-09-07 NOTE — PLAN OF CARE
Plan of care reviewed with pt and spouse; both verbalize understanding  Pt able to verbalize needs; pain controlled with PRN hydrocodone  AAOx4; VSS; A-paced on tele monitor  NC 2L  Limb alert to RUE  Pt free of falls  Safety precautions maintained

## 2020-09-07 NOTE — PROGRESS NOTES
[facility] Hospital Medicine  Progress Note    Patient Name: Rafael Doe  MRN: [id number]  Patient Class: IP- Inpatient   Admission Date: 9/5/2020  Length of Stay: 0 days  Attending Physician: Latanya Mark MD  Primary Care Provider: Corby Perdomo MD        Subjective:     Principal Problem:Cellulitis        HPI:  74 y/o WM with hx of ESRD on HD (TTS), DM2, CAGB (1995), LVAD (2010), heart transplant (2012), HLD, anemia, hypothyroidism, GERD, pacemaker, mediport, hiatal hernia, merkel cell carcinoma, AV fistula, recurrent cellulitis to ED with c/o gradually worsening fatigue, weakness, confusion, fever (Tmax 101.0), and LUE edema, warmth and redness since last night, causing him to miss HD this AM. No exacerbating or mitigating factors noted - symptoms are persistent and of moderate severity. ROS is limited by AMS - per pt's wife, they are displaced from [place] d/t [place]. Found in ED to be febrile (100.5), but VS otherwise stable, with leukocytosis (14.64), hyperkalemia (5.4), hyperglycemia (165), elevated creatinine (4.7), BNP (1030), troponin (0.103) and procalcitonin (0.37). EKG showed a paced rhythm, CT of the abdomen/pelvis was non-acute, CT of the head was unremarkable, Cxray showed congestion, COVID-19 negative. Pt was given po tylenol and IV vancomycin and zosyn in ED with resolution of fever - confusion remains but pt denies pain. Hospital medicine was called and pt placed in observation on telemetry. Pt is a full code - surrogate decision maker is his wife, Keyanna Doe.     Overview/Hospital Course:  Patient was kept overnight on OBS for R arm cellulitis. He was stared on IV vancomycin and IV zosyn. Nephrology was consulted and pt is to have HD today since he missed tx on Saturday.   WBC decreased from 14K to 8K, and arm is less red.  AMS has resolved and spouse reports he is back at baseline. Patient scheduled to dc home after HD today.       Interval History: WBC  decreased from 14K to 8K, and arm is less red. ptis AMS has resolved and spouse reports he is back at baseline. Patient will continue oral bactrim DS daily X 10 days and f/u with PCP. Discussed with nephrology who is in agreement with dc home after HD today. Spouse and pt aware of plan and in agreement. Rx sent to [facility] in [place].        Review of Systems   Constitutional: Negative for activity change, appetite change, chills, fatigue and fever.   HENT: Negative for dental problem, ear pain, hearing loss, mouth sores, nosebleeds, sinus pressure, sore throat, tinnitus and trouble swallowing.    Eyes: Negative for pain, discharge and visual disturbance.   Respiratory: Negative for cough, choking, chest tightness, shortness of breath and wheezing.    Cardiovascular: Negative for chest pain, palpitations and leg swelling.   Gastrointestinal: Negative.  Negative for abdominal distention, abdominal pain, anal bleeding, blood in stool, constipation, diarrhea, nausea and vomiting.   Endocrine: Negative for cold intolerance, heat intolerance, polydipsia, polyphagia and polyuria.   Genitourinary: Negative for decreased urine volume, difficulty urinating, flank pain, frequency, hematuria and urgency.   Musculoskeletal: Negative for arthralgias, back pain, gait problem, myalgias, neck pain and neck stiffness.   Skin: Negative for color change, rash and wound.   Allergic/Immunologic: Negative for food allergies and immunocompromised state.   Neurological: Negative for dizziness, tremors, seizures, syncope, speech difficulty, weakness, light-headedness and headaches.   Hematological: Negative for adenopathy. Does not bruise/bleed easily.   Psychiatric/Behavioral: Negative for confusion and sleep disturbance. The patient is not nervous/anxious.    All other systems reviewed and are negative.    Objective:     Vital Signs (Most Recent):  Temp: 97 °F (36.1 °C) (09/07/20 1103)  Pulse: 78 (09/07/20 1510)  Resp: 18 (09/07/20  1103)  BP: 122/71 (20 1103)  SpO2: 98 % (20 1103) Vital Signs (24h Range):  Temp:  [97 °F (36.1 °C)-99.1 °F (37.3 °C)] 97 °F (36.1 °C)  Pulse:  [75-94] 78  Resp:  [18-20] 18  SpO2:  [91 %-98 %] 98 %  BP: (114-158)/(58-80) 122/71     Weight: 108.9 kg (240 lb 1.3 oz)  Body mass index is 36.5 kg/m².    Intake/Output Summary (Last 24 hours) at 2020 1611  Last data filed at 2020 0900  Gross per 24 hour   Intake 870 ml   Output 4702 ml   Net -3832 ml      Physical Exam  Vitals signs and nursing note reviewed.   Constitutional:       Appearance: Normal appearance. He is obese.      Comments:      HENT:      Head: Normocephalic and atraumatic.      Nose: Nose normal. No congestion.      Mouth/Throat:      Mouth: Mucous membranes are moist.   Eyes:      General: No scleral icterus.     Pupils: Pupils are equal, round, and reactive to light.   Neck:      Musculoskeletal: Normal range of motion and neck supple. No muscular tenderness.   Cardiovascular:      Rate and Rhythm: Normal rate and regular rhythm.      Pulses:           Dorsalis pedis pulses are 1+ on the right side and 1+ on the left side.      Heart sounds: Normal heart sounds.      Comments: LCW pacemaker present  Pulmonary:      Effort: Pulmonary effort is normal.      Breath sounds: Normal breath sounds.   Abdominal:      General: Abdomen is flat. Bowel sounds are normal.      Palpations: Abdomen is soft.   Musculoskeletal: Normal range of motion.      Right lower le+ Edema (chronic per wife) present.      Left lower le+ Edema (chronic per wife) present.   Skin:     General: Skin is warm and dry.      Capillary Refill: Capillary refill takes less than 2 seconds.      Coloration: Skin is not jaundiced.      Findings: No bruising or erythema.      Comments: RUE AV fistula with (+) thrill/bruit  BLE coarse skin - chronic per wife   Neurological:      General: No focal deficit present.      Mental Status: He is alert, oriented to person,  place, and time and easily aroused. Mental status is at baseline.      Motor: Weakness present.   Psychiatric:         Mood and Affect: Mood normal.         Behavior: Behavior normal.         Cognition and Memory: Cognition is impaired.         All pertinent labs within the past 24 hours have been reviewed.    Significant Imaging: I have reviewed all pertinent imaging results/findings within the past 24 hours.      Assessment/Plan:      * L arm cellulitis  Recurrent per pt's wife  BC x2 pending  Continue IV vancomycin, zosyn for now  Tylenol prn for fever  09/07:  --BC + strep gordinni  --ID consulted  --WBC improving  --IV rocephin, oral bactrim (per Nephrology)    Streptococcal bacteremia  --BC shows strep Gordinni  --ID consulted  --IV rocephin  --repeat BC drawn today      Streptococcal cellulitis  --IV rocephin  --PO bactrim per nephrology      Heart transplant recipient  --Transplant in 2012 - ok to remain here per NO  --Continue prograf, hold cellcept per NO-transplant center recs  --Continuous cardiac monitoring    HLD (hyperlipidemia)  Continue home statin      Hypothyroidism  Continue home synthroid      Type 2 diabetes mellitus, with long-term current use of insulin  --Accuchecks with SSI prn  --ADA, renal, cardiac diet  --HA1C 6.2      Elevated troponin  Likely r/t renal failure, baseline unknown - EKG unrevealing, no chest pain  Trend levels  EKG prn        ESRD (end stage renal disease) on dialysis  --nephrology following  --HD on T, Th, Sat  --ADA, renal, cardiac diet  --strict I&O  --daily weights        VTE Risk Mitigation (From admission, onward)         Ordered     Place sequential compression device  Until discontinued      09/05/20 1915                Discharge Planning   BLAIR: 9/6/2020     Code Status: Full Code   Is the patient medically ready for discharge?:     Reason for patient still in hospital (select all that apply): Patient new problem  Discharge Plan A: Home with family                   Kezia Lopez, KAJALP-C  Department of Hospital Medicine   [facility]

## 2020-09-07 NOTE — NURSING
Notified by [facility] that the patients cultures came back gram positive cocci in chains resemble strep. Notified Yomaira Mccall NP and was instructed to cancel the patient discharge orders and give vancomycin.

## 2020-09-07 NOTE — SUBJECTIVE & OBJECTIVE
"Interval History: Pt was seen and examined. No new c/o's, feels "better." No new c/o's, no discomfort, no CP, no SOB, left arm feels better.    Review of patient's allergies indicates:  No Known Allergies  Current Facility-Administered Medications   Medication Frequency    acetaminophen tablet 650 mg Q6H PRN    atorvastatin tablet 20 mg Daily    calcium gluconate 1g in dextrose 5% 100mL (ready to mix system) Q10 Min PRN    cephALEXin capsule 500 mg Q12H    cetirizine tablet 10 mg Daily    dextrose 50% injection 12.5 g PRN    dextrose 50% injection 25 g PRN    docusate sodium capsule 100 mg Nightly    donepeziL tablet 5 mg QHS    escitalopram oxalate tablet 5 mg Daily    ferrous sulfate EC tablet 325 mg Daily    fluticasone propionate 50 mcg/actuation nasal spray 50 mcg Daily PRN    gabapentin capsule 300 mg BID    glucagon (human recombinant) injection 1 mg PRN    glucose chewable tablet 16 g PRN    glucose chewable tablet 24 g PRN    HYDROcodone-acetaminophen  mg per tablet 1 tablet Q6H PRN    insulin aspart U-100 pen 1-10 Units QID (AC + HS) PRN    levothyroxine tablet 150 mcg Daily    metoclopramide HCl tablet 5 mg TID AC    multivitamin tablet Daily    mycophenolate tablet 500 mg BID    pantoprazole EC tablet 40 mg Daily    tacrolimus capsule 2 mg BID    vancomycin - pharmacy to dose pharmacy to manage frequency       Objective:     Vital Signs (Most Recent):  Temp: 97 °F (36.1 °C) (09/07/20 1103)  Pulse: 82 (09/07/20 1103)  Resp: 18 (09/07/20 1103)  BP: 122/71 (09/07/20 1103)  SpO2: 98 % (09/07/20 1103)  O2 Device (Oxygen Therapy): nasal cannula (09/07/20 0819) Vital Signs (24h Range):  Temp:  [97 °F (36.1 °C)-99.1 °F (37.3 °C)] 97 °F (36.1 °C)  Pulse:  [75-84] 82  Resp:  [18-20] 18  SpO2:  [91 %-98 %] 98 %  BP: (114-158)/(58-80) 122/71     Weight: 108.9 kg (240 lb 1.3 oz) (09/07/20 0413)  Body mass index is 36.5 kg/m².  Body surface area is 2.29 meters squared.    I/O last 3 " completed shifts:  In: 1950 [P.O.:650; I.V.:800; Other:500]  Out: 4952 [Urine:451; Other:4500; Stool:1]    Physical Exam  Vitals signs and nursing note reviewed.   Constitutional:       Appearance: Normal appearance.   HENT:      Head: Normocephalic and atraumatic.   Cardiovascular:      Rate and Rhythm: Normal rate and regular rhythm.      Pulses: Normal pulses.      Heart sounds: Normal heart sounds.   Pulmonary:      Effort: Pulmonary effort is normal.      Breath sounds: Normal breath sounds.   Abdominal:      General: Abdomen is flat.      Palpations: Abdomen is soft.   Musculoskeletal:      Right lower leg: No edema.      Left lower leg: No edema.      Comments: L UE +erythema, edema   Skin:     General: Skin is warm and dry.   Neurological:      General: No focal deficit present.      Mental Status: He is alert and oriented to person, place, and time.   Psychiatric:         Mood and Affect: Mood normal.         Behavior: Behavior normal.         Significant Labs: reviewed  BMP  Lab Results   Component Value Date     09/07/2020    K 3.9 09/07/2020    K 3.9 09/07/2020     09/07/2020    CO2 26 09/07/2020    BUN 31 (H) 09/07/2020    CREATININE 3.6 (H) 09/07/2020    CALCIUM 8.7 09/07/2020    ANIONGAP 10 09/07/2020    ESTGFRAFRICA 18 (A) 09/07/2020    EGFRNONAA 15 (A) 09/07/2020     Lab Results   Component Value Date    WBC 7.81 09/07/2020    HGB 11.8 (L) 09/07/2020    HCT 36.9 (L) 09/07/2020    MCV 98 09/07/2020     (L) 09/07/2020     Blood cx 1/2: +GPC in chains      Significant Imaging: reviewed

## 2020-09-07 NOTE — SUBJECTIVE & OBJECTIVE
Attending Attestation (For Attendings USE Only)... Interval History: WBC decreased from 14K to 8K, and arm is less red. ptis AMS has resolved and spouse reports he is back at baseline. Patient will continue oral bactrim DS daily X 10 days and f/u with PCP. Discussed with nephrology who is in agreement with dc home after HD today. Spouse and pt aware of plan and in agreement. Rx sent to [facility] in [place].        Review of Systems   Constitutional: Negative for activity change, appetite change, chills, fatigue and fever.   HENT: Negative for dental problem, ear pain, hearing loss, mouth sores, nosebleeds, sinus pressure, sore throat, tinnitus and trouble swallowing.    Eyes: Negative for pain, discharge and visual disturbance.   Respiratory: Negative for cough, choking, chest tightness, shortness of breath and wheezing.    Cardiovascular: Negative for chest pain, palpitations and leg swelling.   Gastrointestinal: Negative.  Negative for abdominal distention, abdominal pain, anal bleeding, blood in stool, constipation, diarrhea, nausea and vomiting.   Endocrine: Negative for cold intolerance, heat intolerance, polydipsia, polyphagia and polyuria.   Genitourinary: Negative for decreased urine volume, difficulty urinating, flank pain, frequency, hematuria and urgency.   Musculoskeletal: Negative for arthralgias, back pain, gait problem, myalgias, neck pain and neck stiffness.   Skin: Negative for color change, rash and wound.   Allergic/Immunologic: Negative for food allergies and immunocompromised state.   Neurological: Negative for dizziness, tremors, seizures, syncope, speech difficulty, weakness, light-headedness and headaches.   Hematological: Negative for adenopathy. Does not bruise/bleed easily.   Psychiatric/Behavioral: Negative for confusion and sleep disturbance. The patient is not nervous/anxious.    All other systems reviewed and are negative.    Objective:     Vital Signs (Most Recent):  Temp: 97 °F (36.1 °C) (09/07/20 1103)  Pulse: 78 (09/07/20  1510)  Resp: 18 (20 1103)  BP: 122/71 (20 1103)  SpO2: 98 % (20 1103) Vital Signs (24h Range):  Temp:  [97 °F (36.1 °C)-99.1 °F (37.3 °C)] 97 °F (36.1 °C)  Pulse:  [75-94] 78  Resp:  [18-20] 18  SpO2:  [91 %-98 %] 98 %  BP: (114-158)/(58-80) 122/71     Weight: 108.9 kg (240 lb 1.3 oz)  Body mass index is 36.5 kg/m².    Intake/Output Summary (Last 24 hours) at 2020 1611  Last data filed at 2020 0900  Gross per 24 hour   Intake 870 ml   Output 4702 ml   Net -3832 ml      Physical Exam  Vitals signs and nursing note reviewed.   Constitutional:       Appearance: Normal appearance. He is obese.      Comments:      HENT:      Head: Normocephalic and atraumatic.      Nose: Nose normal. No congestion.      Mouth/Throat:      Mouth: Mucous membranes are moist.   Eyes:      General: No scleral icterus.     Pupils: Pupils are equal, round, and reactive to light.   Neck:      Musculoskeletal: Normal range of motion and neck supple. No muscular tenderness.   Cardiovascular:      Rate and Rhythm: Normal rate and regular rhythm.      Pulses:           Dorsalis pedis pulses are 1+ on the right side and 1+ on the left side.      Heart sounds: Normal heart sounds.      Comments: LCW pacemaker present  Pulmonary:      Effort: Pulmonary effort is normal.      Breath sounds: Normal breath sounds.   Abdominal:      General: Abdomen is flat. Bowel sounds are normal.      Palpations: Abdomen is soft.   Musculoskeletal: Normal range of motion.      Right lower le+ Edema (chronic per wife) present.      Left lower le+ Edema (chronic per wife) present.   Skin:     General: Skin is warm and dry.      Capillary Refill: Capillary refill takes less than 2 seconds.      Coloration: Skin is not jaundiced.      Findings: No bruising or erythema.      Comments: RUE AV fistula with (+) thrill/bruit  BLE coarse skin - chronic per wife   Neurological:      General: No focal deficit present.      Mental Status: He is  alert, oriented to person, place, and time and easily aroused. Mental status is at baseline.      Motor: Weakness present.   Psychiatric:         Mood and Affect: Mood normal.         Behavior: Behavior normal.         Cognition and Memory: Cognition is impaired.         All pertinent labs within the past 24 hours have been reviewed.    Significant Imaging: I have reviewed all pertinent imaging results/findings within the past 24 hours.

## 2020-09-07 NOTE — CARE UPDATE
Chart reviewed .  ESRD on HD  Left arm cellulitis .  Strep Gordonii.  Plan -  Full note to follow.  Will do ECHo  Will need 2 weeks of therapy -can give 2 weeks of Vanco post HD

## 2020-09-07 NOTE — SUBJECTIVE & OBJECTIVE
Interval History: Patient with 1BC returned Gram + cocci resembling strep. On cephalexin and IV vancomycin ordered by night NP. Redrawing BC today. Possible contamination - will follow sensitivities.    Review of Systems   Constitutional: Negative for activity change, appetite change, chills, fatigue and fever.   HENT: Negative for dental problem, ear pain, hearing loss, mouth sores, nosebleeds, sinus pressure, sore throat, tinnitus and trouble swallowing.    Eyes: Negative for pain, discharge and visual disturbance.   Respiratory: Negative for cough, choking, chest tightness, shortness of breath and wheezing.    Cardiovascular: Negative for chest pain, palpitations and leg swelling.   Gastrointestinal: Negative.  Negative for abdominal distention, abdominal pain, anal bleeding, blood in stool, constipation, diarrhea, nausea and vomiting.   Endocrine: Negative for cold intolerance, heat intolerance, polydipsia, polyphagia and polyuria.   Genitourinary: Negative for decreased urine volume, difficulty urinating, flank pain, frequency, hematuria and urgency.   Musculoskeletal: Negative for arthralgias, back pain, gait problem, myalgias, neck pain and neck stiffness.   Skin: Negative for color change, rash and wound.   Allergic/Immunologic: Negative for food allergies and immunocompromised state.   Neurological: Negative for dizziness, tremors, seizures, syncope, speech difficulty, weakness, light-headedness and headaches.   Hematological: Negative for adenopathy. Does not bruise/bleed easily.   Psychiatric/Behavioral: Negative for confusion and sleep disturbance. The patient is not nervous/anxious.    All other systems reviewed and are negative.    Objective:     Vital Signs (Most Recent):  Temp: 98.3 °F (36.8 °C) (09/07/20 0753)  Pulse: 82 (09/07/20 0819)  Resp: 20 (09/07/20 0848)  BP: (!) 123/58 (09/07/20 0753)  SpO2: (!) 93 % (09/07/20 0819) Vital Signs (24h Range):  Temp:  [97.7 °F (36.5 °C)-99.1 °F (37.3 °C)] 98.3  °F (36.8 °C)  Pulse:  [75-84] 82  Resp:  [18-20] 20  SpO2:  [91 %-98 %] 93 %  BP: (114-158)/(57-80) 123/58     Weight: 108.9 kg (240 lb 1.3 oz)  Body mass index is 36.5 kg/m².    Intake/Output Summary (Last 24 hours) at 2020 0854  Last data filed at 2020 0413  Gross per 24 hour   Intake 870 ml   Output 4502 ml   Net -3632 ml      Physical Exam  Vitals signs reviewed.   Constitutional:       General: He is not in acute distress.     Appearance: Normal appearance. He is obese. He is ill-appearing. He is not toxic-appearing or diaphoretic.      Comments: Drowsy but arousable     HENT:      Head: Normocephalic and atraumatic.      Nose: Nose normal. No congestion.      Mouth/Throat:      Mouth: Mucous membranes are moist.   Eyes:      General: No scleral icterus.     Pupils: Pupils are equal, round, and reactive to light.   Neck:      Musculoskeletal: Normal range of motion and neck supple. No muscular tenderness.   Cardiovascular:      Rate and Rhythm: Normal rate and regular rhythm.      Pulses:           Dorsalis pedis pulses are 1+ on the right side and 1+ on the left side.      Heart sounds: Normal heart sounds.      Comments: LCW pacemaker present  Pulmonary:      Effort: Pulmonary effort is normal.      Breath sounds: Normal breath sounds.   Abdominal:      General: Abdomen is flat. Bowel sounds are normal.      Palpations: Abdomen is soft.   Musculoskeletal: Normal range of motion.      Right lower le+ Edema (chronic per wife) present.      Left lower le+ Edema (chronic per wife) present.   Skin:     General: Skin is warm and dry.      Capillary Refill: Capillary refill takes less than 2 seconds.      Coloration: Skin is not jaundiced.      Findings: Erythema (warm, red, edematous to LUE) present. No bruising.      Comments: RUE AV fistula with (+) thrill/bruit  BLE coarse skin - chronic per wife   Neurological:      General: No focal deficit present.      Mental Status: He is easily aroused. He  is lethargic and disoriented.      Motor: Weakness present.   Psychiatric:         Mood and Affect: Mood normal.         Behavior: Behavior normal.         Cognition and Memory: Cognition is impaired.         Significant Labs:   Recent Lab Results       09/07/20  0747   09/07/20  0544   09/07/20  0354   09/06/20  2231   09/06/20  2219        Anion Gap 11   12 17       Baso #     0.02         Basophil%     0.3         BUN, Bld 29   28 22       Calcium 8.7   8.3 9.1       Chloride 100   101 105       CO2 27   26 18       Creatinine 3.5   3.5 2.9       Differential Method     Automated         eGFR if  19   19 23       eGFR if non  16  Comment:  Calculation used to obtain the estimated glomerular filtration  rate (eGFR) is the CKD-EPI equation.      16  Comment:  Calculation used to obtain the estimated glomerular filtration  rate (eGFR) is the CKD-EPI equation.    20  Comment:  Calculation used to obtain the estimated glomerular filtration  rate (eGFR) is the CKD-EPI equation.          Eos #     0.2         Eosinophil%     2.0         Glucose 131   164 130       Gran # (ANC)     5.9         Gran%     74.9         Hematocrit     36.9         Hemoglobin     11.8         Immature Grans (Abs)     0.03  Comment:  Mild elevation in immature granulocytes is non specific and   can be seen in a variety of conditions including stress response,   acute inflammation, trauma and pregnancy. Correlation with other   laboratory and clinical findings is essential.           Immature Granulocytes     0.4         Lymph #     1.0         Lymph%     12.4         Magnesium     1.9         MCH     31.2         MCHC     32.0         MCV     98         Mono #     0.8         Mono%     10.0         MPV     10.2         nRBC     0         Platelet Estimate     Appears normal         Platelets     122         POCT Glucose   132     134     Potassium 4.0   4.2 5.6        4.0     5.6       RBC     3.78         RDW      13.6         Sodium 138   139 140       Vancomycin, Random       12.7       WBC     7.81                          09/06/20  1954   09/06/20  1709   09/06/20  1555   09/06/20  1158   09/06/20  1132        Anion Gap 11   13   13     Baso #               Basophil%               BUN, Bld 26   53   51     Calcium 9.2   8.9   9.3     Chloride 102   106   107     CO2 26   21   20     Creatinine 2.8   4.9   4.7     Differential Method               eGFR if  24   12   13     eGFR if non  21  Comment:  Calculation used to obtain the estimated glomerular filtration  rate (eGFR) is the CKD-EPI equation.      11  Comment:  Calculation used to obtain the estimated glomerular filtration  rate (eGFR) is the CKD-EPI equation.      11  Comment:  Calculation used to obtain the estimated glomerular filtration  rate (eGFR) is the CKD-EPI equation.        Eos #               Eosinophil%               Glucose 132   154   156     Gran # (ANC)               Gran%               Hematocrit               Hemoglobin               Immature Grans (Abs)               Immature Granulocytes               Lymph #               Lymph%               Magnesium               MCH               MCHC               MCV               Mono #               Mono%               MPV               nRBC               Platelet Estimate               Platelets               POCT Glucose   178   166       Potassium 3.6   5.0   4.9      3.6   5.0   4.9     RBC               RDW               Sodium 139   140   140     Vancomycin, Random               WBC                                  All pertinent labs within the past 24 hours have been reviewed.    Significant Imaging: I have reviewed all pertinent imaging results/findings within the past 24 hours.

## 2020-09-08 VITALS
DIASTOLIC BLOOD PRESSURE: 72 MMHG | OXYGEN SATURATION: 96 % | HEIGHT: 68 IN | HEART RATE: 80 BPM | BODY MASS INDEX: 36.07 KG/M2 | SYSTOLIC BLOOD PRESSURE: 134 MMHG | WEIGHT: 238 LBS | RESPIRATION RATE: 18 BRPM | TEMPERATURE: 99 F

## 2020-09-08 LAB
ANION GAP SERPL CALC-SCNC: 10 MMOL/L (ref 8–16)
ANION GAP SERPL CALC-SCNC: 12 MMOL/L (ref 8–16)
ANION GAP SERPL CALC-SCNC: 13 MMOL/L (ref 8–16)
ANION GAP SERPL CALC-SCNC: 14 MMOL/L (ref 8–16)
AORTIC ROOT ANNULUS: 3.66 CM
ASCENDING AORTA: 3.58 CM
AV INDEX (PROSTH): 0.55
AV MEAN GRADIENT: 6 MMHG
AV PEAK GRADIENT: 11 MMHG
AV VALVE AREA: 2.66 CM2
AV VELOCITY RATIO: 0.48
BACTERIA BLD CULT: ABNORMAL
BASOPHILS # BLD AUTO: 0.02 K/UL (ref 0–0.2)
BASOPHILS NFR BLD: 0.3 % (ref 0–1.9)
BSA FOR ECHO PROCEDURE: 2.28 M2
BUN SERPL-MCNC: 39 MG/DL (ref 8–23)
BUN SERPL-MCNC: 40 MG/DL (ref 8–23)
BUN SERPL-MCNC: 40 MG/DL (ref 8–23)
BUN SERPL-MCNC: 41 MG/DL (ref 8–23)
CALCIUM SERPL-MCNC: 8.4 MG/DL (ref 8.7–10.5)
CALCIUM SERPL-MCNC: 8.7 MG/DL (ref 8.7–10.5)
CALCIUM SERPL-MCNC: 8.8 MG/DL (ref 8.7–10.5)
CALCIUM SERPL-MCNC: 8.8 MG/DL (ref 8.7–10.5)
CHLORIDE SERPL-SCNC: 102 MMOL/L (ref 95–110)
CHLORIDE SERPL-SCNC: 103 MMOL/L (ref 95–110)
CHLORIDE SERPL-SCNC: 103 MMOL/L (ref 95–110)
CHLORIDE SERPL-SCNC: 104 MMOL/L (ref 95–110)
CO2 SERPL-SCNC: 23 MMOL/L (ref 23–29)
CO2 SERPL-SCNC: 25 MMOL/L (ref 23–29)
CREAT SERPL-MCNC: 4.2 MG/DL (ref 0.5–1.4)
CREAT SERPL-MCNC: 4.4 MG/DL (ref 0.5–1.4)
CV ECHO LV RWT: 0.58 CM
DIFFERENTIAL METHOD: ABNORMAL
DOP CALC AO PEAK VEL: 1.65 M/S
DOP CALC AO VTI: 29.52 CM
DOP CALC LVOT AREA: 4.8 CM2
DOP CALC LVOT DIAMETER: 2.47 CM
DOP CALC LVOT PEAK VEL: 0.8 M/S
DOP CALC LVOT STROKE VOLUME: 78.45 CM3
DOP CALC RVOT PEAK VEL: 0.71 M/S
DOP CALC RVOT VTI: 14.87 CM
DOP CALCLVOT PEAK VEL VTI: 16.38 CM
ECHO LV POSTERIOR WALL: 1.39 CM (ref 0.6–1.1)
EOSINOPHIL # BLD AUTO: 0.2 K/UL (ref 0–0.5)
EOSINOPHIL NFR BLD: 3 % (ref 0–8)
ERYTHROCYTE [DISTWIDTH] IN BLOOD BY AUTOMATED COUNT: 13.2 % (ref 11.5–14.5)
EST. GFR  (AFRICAN AMERICAN): 14 ML/MIN/1.73 M^2
EST. GFR  (AFRICAN AMERICAN): 15 ML/MIN/1.73 M^2
EST. GFR  (NON AFRICAN AMERICAN): 12 ML/MIN/1.73 M^2
EST. GFR  (NON AFRICAN AMERICAN): 13 ML/MIN/1.73 M^2
FRACTIONAL SHORTENING: 38 % (ref 28–44)
GLUCOSE SERPL-MCNC: 123 MG/DL (ref 70–110)
GLUCOSE SERPL-MCNC: 125 MG/DL (ref 70–110)
GLUCOSE SERPL-MCNC: 134 MG/DL (ref 70–110)
GLUCOSE SERPL-MCNC: 142 MG/DL (ref 70–110)
HCT VFR BLD AUTO: 28 % (ref 40–54)
HGB BLD-MCNC: 9.5 G/DL (ref 14–18)
IMM GRANULOCYTES # BLD AUTO: 0.15 K/UL (ref 0–0.04)
IMM GRANULOCYTES NFR BLD AUTO: 2.4 % (ref 0–0.5)
INTERVENTRICULAR SEPTUM: 1.86 CM (ref 0.6–1.1)
LA MAJOR: 5.04 CM
LA MINOR: 4.01 CM
LA WIDTH: 4.43 CM
LEFT ATRIUM SIZE: 3.7 CM
LEFT ATRIUM VOLUME INDEX: 28.3 ML/M2
LEFT ATRIUM VOLUME: 62.23 CM3
LEFT INTERNAL DIMENSION IN SYSTOLE: 3 CM (ref 2.1–4)
LEFT VENTRICLE DIASTOLIC VOLUME INDEX: 48.81 ML/M2
LEFT VENTRICLE DIASTOLIC VOLUME: 107.42 ML
LEFT VENTRICLE MASS INDEX: 156 G/M2
LEFT VENTRICLE SYSTOLIC VOLUME INDEX: 15.9 ML/M2
LEFT VENTRICLE SYSTOLIC VOLUME: 35.09 ML
LEFT VENTRICULAR INTERNAL DIMENSION IN DIASTOLE: 4.8 CM (ref 3.5–6)
LEFT VENTRICULAR MASS: 342.61 G
LYMPHOCYTES # BLD AUTO: 0.9 K/UL (ref 1–4.8)
LYMPHOCYTES NFR BLD: 14.1 % (ref 18–48)
MAGNESIUM SERPL-MCNC: 1.9 MG/DL (ref 1.6–2.6)
MCH RBC QN AUTO: 32.3 PG (ref 27–31)
MCHC RBC AUTO-ENTMCNC: 33.9 G/DL (ref 32–36)
MCV RBC AUTO: 95 FL (ref 82–98)
MONOCYTES # BLD AUTO: 0.6 K/UL (ref 0.3–1)
MONOCYTES NFR BLD: 8.9 % (ref 4–15)
NEUTROPHILS # BLD AUTO: 4.5 K/UL (ref 1.8–7.7)
NEUTROPHILS NFR BLD: 71.3 % (ref 38–73)
NRBC BLD-RTO: 0 /100 WBC
PISA TR MAX VEL: 3.98 M/S
PLATELET # BLD AUTO: 115 K/UL (ref 150–350)
PLATELET BLD QL SMEAR: ABNORMAL
PMV BLD AUTO: 10.4 FL (ref 9.2–12.9)
POCT GLUCOSE: 123 MG/DL (ref 70–110)
POCT GLUCOSE: 138 MG/DL (ref 70–110)
POTASSIUM SERPL-SCNC: 3.8 MMOL/L (ref 3.5–5.1)
POTASSIUM SERPL-SCNC: 3.8 MMOL/L (ref 3.5–5.1)
POTASSIUM SERPL-SCNC: 4.2 MMOL/L (ref 3.5–5.1)
POTASSIUM SERPL-SCNC: 4.3 MMOL/L (ref 3.5–5.1)
PV MEAN GRADIENT: 1.04 MMHG
RA MAJOR: 4.36 CM
RA PRESSURE: 15 MMHG
RBC # BLD AUTO: 2.94 M/UL (ref 4.6–6.2)
SINUS: 3.44 CM
SODIUM SERPL-SCNC: 138 MMOL/L (ref 136–145)
SODIUM SERPL-SCNC: 139 MMOL/L (ref 136–145)
STJ: 2.63 CM
TDI LATERAL: 0.15 M/S
TDI SEPTAL: 0.1 M/S
TDI: 0.13 M/S
TR MAX PG: 63 MMHG
TRICUSPID ANNULAR PLANE SYSTOLIC EXCURSION: 1.34 CM
TV REST PULMONARY ARTERY PRESSURE: 78 MMHG
VANCOMYCIN SERPL-MCNC: 20.5 UG/ML
WBC # BLD AUTO: 6.3 K/UL (ref 3.9–12.7)

## 2020-09-08 PROCEDURE — 25000003 PHARM REV CODE 250: Performed by: HOSPITALIST

## 2020-09-08 PROCEDURE — 83735 ASSAY OF MAGNESIUM: CPT

## 2020-09-08 PROCEDURE — 99233 PR SUBSEQUENT HOSPITAL CARE,LEVL III: ICD-10-PCS | Mod: ,,, | Performed by: INTERNAL MEDICINE

## 2020-09-08 PROCEDURE — 25000003 PHARM REV CODE 250: Performed by: NURSE PRACTITIONER

## 2020-09-08 PROCEDURE — 80202 ASSAY OF VANCOMYCIN: CPT

## 2020-09-08 PROCEDURE — 99900035 HC TECH TIME PER 15 MIN (STAT)

## 2020-09-08 PROCEDURE — 63600175 PHARM REV CODE 636 W HCPCS: Performed by: HOSPITALIST

## 2020-09-08 PROCEDURE — 85025 COMPLETE CBC W/AUTO DIFF WBC: CPT

## 2020-09-08 PROCEDURE — 99233 SBSQ HOSP IP/OBS HIGH 50: CPT | Mod: ,,, | Performed by: INTERNAL MEDICINE

## 2020-09-08 PROCEDURE — 94761 N-INVAS EAR/PLS OXIMETRY MLT: CPT

## 2020-09-08 PROCEDURE — 36415 COLL VENOUS BLD VENIPUNCTURE: CPT

## 2020-09-08 PROCEDURE — 80048 BASIC METABOLIC PNL TOTAL CA: CPT

## 2020-09-08 PROCEDURE — 63600175 PHARM REV CODE 636 W HCPCS: Performed by: NURSE PRACTITIONER

## 2020-09-08 PROCEDURE — 80100016 HC MAINTENANCE HEMODIALYSIS

## 2020-09-08 PROCEDURE — 63600175 PHARM REV CODE 636 W HCPCS: Performed by: INTERNAL MEDICINE

## 2020-09-08 PROCEDURE — 27000221 HC OXYGEN, UP TO 24 HOURS

## 2020-09-08 RX ADMIN — CEFTRIAXONE 1 G: 1 INJECTION, SOLUTION INTRAVENOUS at 02:09

## 2020-09-08 RX ADMIN — PANTOPRAZOLE SODIUM 40 MG: 40 TABLET, DELAYED RELEASE ORAL at 09:09

## 2020-09-08 RX ADMIN — GABAPENTIN 300 MG: 300 CAPSULE ORAL at 09:09

## 2020-09-08 RX ADMIN — TACROLIMUS 2 MG: 1 CAPSULE ORAL at 09:09

## 2020-09-08 RX ADMIN — FERROUS SULFATE TAB EC 325 MG (65 MG FE EQUIVALENT) 325 MG: 325 (65 FE) TABLET DELAYED RESPONSE at 09:09

## 2020-09-08 RX ADMIN — LEVOTHYROXINE SODIUM 150 MCG: 150 TABLET ORAL at 05:09

## 2020-09-08 RX ADMIN — METOCLOPRAMIDE HYDROCHLORIDE 5 MG: 5 TABLET ORAL at 11:09

## 2020-09-08 RX ADMIN — CEFTRIAXONE 1 G: 1 INJECTION, SOLUTION INTRAVENOUS at 03:09

## 2020-09-08 RX ADMIN — VANCOMYCIN HYDROCHLORIDE 500 MG: 500 INJECTION, POWDER, LYOPHILIZED, FOR SOLUTION INTRAVENOUS at 07:09

## 2020-09-08 RX ADMIN — ATORVASTATIN CALCIUM 20 MG: 10 TABLET, FILM COATED ORAL at 09:09

## 2020-09-08 RX ADMIN — HYDROCODONE BITARTRATE AND ACETAMINOPHEN 1 TABLET: 10; 325 TABLET ORAL at 12:09

## 2020-09-08 RX ADMIN — MYCOPHENOLATE MOFETIL 500 MG: 500 TABLET ORAL at 09:09

## 2020-09-08 RX ADMIN — THERA TABS 1 TABLET: TAB at 09:09

## 2020-09-08 RX ADMIN — ESCITALOPRAM OXALATE 5 MG: 5 TABLET, FILM COATED ORAL at 09:09

## 2020-09-08 RX ADMIN — METOCLOPRAMIDE HYDROCHLORIDE 5 MG: 5 TABLET ORAL at 05:09

## 2020-09-08 RX ADMIN — HYDROCODONE BITARTRATE AND ACETAMINOPHEN 1 TABLET: 10; 325 TABLET ORAL at 05:09

## 2020-09-08 RX ADMIN — CETIRIZINE HYDROCHLORIDE 10 MG: 10 TABLET, FILM COATED ORAL at 09:09

## 2020-09-08 NOTE — ASSESSMENT & PLAN NOTE
74 y/o male with ESRD presented with L UE cellulitis:     ESRD (end stage renal disease) on dialysis  ESRD pt, on HD x 2 years. q TTS HD at [facility] [place]  Displaced from [place]  S/p HD yesterday, tolerated HD well  Next HD in am  HD orders placed in epic  Pulmonary edema: improved with HD. SOB resolved. O2 sat good  Hyperkalemia: resolve with HD, K normal  No indications for Hd today  HD will not help with the unilateral L arm edema. That edema is due to local infection/cellulitis. Will continue to benefit from antibiotics and arm elevation. Pt and wife were advised.      * L arm cellulitis  Presented with fever and leukocytosis. Afebrile now, WBC normal  Blood cx 1/2 positive for GPC resembling Streptococci  Good response to abx  Per wife, this is not the first episode of cellulitis in the same arm  Recommend aggressive abx approach   Recommend add ceftriaxone 1 g IV q 12 hours to address Streptococcal bacteremia and cellulitis  D/c PO keflex      Heart transplant recipient  Immunosuppressed on prograf and cellcept  Doses were reviewed        Plans and recommendations:  As discussed above  Opportunity for questions provided  HD in am

## 2020-09-08 NOTE — SUBJECTIVE & OBJECTIVE
Interval History: Pt was seen and examined this am. No new c/o's, feels better, L arm feels better, no fever, no new c/o's.    Review of patient's allergies indicates:  No Known Allergies  Current Facility-Administered Medications   Medication Frequency    acetaminophen tablet 650 mg Q6H PRN    atorvastatin tablet 20 mg Daily    calcium gluconate 1g in dextrose 5% 100mL (ready to mix system) Q10 Min PRN    cefTRIAXone (ROCEPHIN) 1 g/50 mL D5W IVPB Q12H    cetirizine tablet 10 mg Daily    dextrose 50% injection 12.5 g PRN    dextrose 50% injection 25 g PRN    docusate sodium capsule 100 mg Nightly    donepeziL tablet 5 mg QHS    escitalopram oxalate tablet 5 mg Daily    ferrous sulfate EC tablet 325 mg Daily    fluticasone propionate 50 mcg/actuation nasal spray 50 mcg Daily PRN    gabapentin capsule 300 mg BID    glucagon (human recombinant) injection 1 mg PRN    glucose chewable tablet 16 g PRN    glucose chewable tablet 24 g PRN    HYDROcodone-acetaminophen  mg per tablet 1 tablet Q6H PRN    insulin aspart U-100 pen 1-10 Units QID (AC + HS) PRN    levothyroxine tablet 150 mcg Daily    metoclopramide HCl tablet 5 mg TID AC    multivitamin tablet Daily    mycophenolate tablet 500 mg BID    pantoprazole EC tablet 40 mg Daily    tacrolimus capsule 2 mg BID    vancomycin - pharmacy to dose pharmacy to manage frequency    vancomycin 500 mg in dextrose 5 % 100 mL IVPB (ready to mix system) Once       Objective:     Vital Signs (Most Recent):  Temp: 98.7 °F (37.1 °C) (09/08/20 1212)  Pulse: 79 (09/08/20 1212)  Resp: 16 (09/08/20 1241)  BP: 124/62 (09/08/20 1212)  SpO2: 96 % (09/08/20 1212)  O2 Device (Oxygen Therapy): nasal cannula (09/08/20 0820) Vital Signs (24h Range):  Temp:  [97.3 °F (36.3 °C)-98.8 °F (37.1 °C)] 98.7 °F (37.1 °C)  Pulse:  [78-82] 79  Resp:  [16-20] 16  SpO2:  [92 %-100 %] 96 %  BP: (110-138)/(59-73) 124/62     Weight: 108 kg (238 lb) (09/08/20 0748)  Body mass index is  36.19 kg/m².  Body surface area is 2.28 meters squared.    I/O last 3 completed shifts:  In: 1328 [P.O.:478; I.V.:300; Other:500; IV Piggyback:50]  Out: 4802 [Urine:101; Other:4700; Stool:1]    Physical Exam  Vitals signs and nursing note reviewed.   Constitutional:       Appearance: Normal appearance.   HENT:      Head: Normocephalic and atraumatic.   Cardiovascular:      Rate and Rhythm: Normal rate and regular rhythm.      Pulses: Normal pulses.      Heart sounds: Normal heart sounds.   Pulmonary:      Effort: Pulmonary effort is normal.      Breath sounds: Normal breath sounds.   Abdominal:      General: Abdomen is flat.      Palpations: Abdomen is soft.   Musculoskeletal:      Right lower leg: No edema.      Left lower leg: No edema.      Comments: L UE +erythema, edema   Skin:     General: Skin is warm and dry.   Neurological:      General: No focal deficit present.      Mental Status: He is alert and oriented to person, place, and time.   Psychiatric:         Mood and Affect: Mood normal.         Behavior: Behavior normal.         Significant Labs: reviewed  BMP  Lab Results   Component Value Date     09/08/2020    K 4.3 09/08/2020     09/08/2020    CO2 23 09/08/2020    BUN 41 (H) 09/08/2020    CREATININE 4.4 (H) 09/08/2020    CALCIUM 8.8 09/08/2020    ANIONGAP 12 09/08/2020    ESTGFRAFRICA 14 (A) 09/08/2020    EGFRNONAA 12 (A) 09/08/2020     Lab Results   Component Value Date    WBC 6.30 09/08/2020    HGB 9.5 (L) 09/08/2020    HCT 28.0 (L) 09/08/2020    MCV 95 09/08/2020     (L) 09/08/2020         Significant Imaging: reviewed CXR

## 2020-09-08 NOTE — DISCHARGE SUMMARY
[facility] Hospital Medicine  Discharge Summary      Patient Name: Rafael Doe  MRN: [id number]  Admission Date: 9/5/2020  Hospital Length of Stay: 1 days  Discharge Date and Time:  09/08/2020 10:09 AM  Attending Physician: Quentin Hernandez MD   Discharging Provider: KODY Keenan  Primary Care Provider: Corby Perdomo MD      HPI:   76 y/o WM with hx of ESRD on HD (TTS), DM2, CAGB (1995), LVAD (2010), heart transplant (2012), HLD, anemia, hypothyroidism, GERD, pacemaker, mediport, hiatal hernia, merkel cell carcinoma, AV fistula, recurrent cellulitis to ED with c/o gradually worsening fatigue, weakness, confusion, fever (Tmax 101.0), and LUE edema, warmth and redness since last night, causing him to miss HD this AM. No exacerbating or mitigating factors noted - symptoms are persistent and of moderate severity. ROS is limited by AMS - per pt's wife, they are displaced from [place] d/t [place]. Found in ED to be febrile (100.5), but VS otherwise stable, with leukocytosis (14.64), hyperkalemia (5.4), hyperglycemia (165), elevated creatinine (4.7), BNP (1030), troponin (0.103) and procalcitonin (0.37). EKG showed a paced rhythm, CT of the abdomen/pelvis was non-acute, CT of the head was unremarkable, Cxray showed congestion, COVID-19 negative. Pt was given po tylenol and IV vancomycin and zosyn in ED with resolution of fever - confusion remains but pt denies pain. Hospital medicine was called and pt placed in observation on telemetry. Pt is a full code - surrogate decision maker is his wife, Keyanna Doe.     * No surgery found *      Hospital Course:   Patient was kept overnight on OBS for R arm cellulitis. He was stared on IV vancomycin and IV zosyn. Nephrology was consulted and pt is to have HD today since he missed tx on Saturday.   WBC decreased from 14K to 8K, and arm is less red.  AMS has resolved and spouse reports he is back at baseline. Patient scheduled to dc home  after HD today.   09/07:  Patient with 1BC returned Gram + cocci resembling strep during HD yesterday evening. DC cancelled. On cephalexin and IV vancomycin ordered by night NP. Redrawing BC today. Possible contamination - will follow sensitivities. 09/08: Discussed with ID - BC grew Stephani Quispe. Repeat BC with NGTD X 1 day - will need 2 weeks IV vancomycin after HD and f/u with PCP. Patient to have HD today then f/u with [facility] [place]. Discussed with dr. Lacy, who will notify dialysis unit to give IV vancomycin. Discussed with pt and spouse, who are happy to be going home today.     Consults:   Consults (From admission, onward)        Status Ordering Provider     Inpatient consult to Infectious Diseases  Once     Provider:  Wayne Delarosa MD    Acknowledged DESTINY CHE     Inpatient consult to Nephrology  Once     Provider:  Rajwinder Lacy MD    Acknowledged KATIANA NAVARRO     Pharmacy to dose Vancomycin consult  Once     Provider:  (Not yet assigned)    Acknowledged FELIX MORRISON          No new Assessment & Plan notes have been filed under this hospital service since the last note was generated.  Service: Hospital Medicine    Final Active Diagnoses:    Diagnosis Date Noted POA    PRINCIPAL PROBLEM:  L arm cellulitis [L03.90] 09/05/2020 Yes    Streptococcal cellulitis [L03.90, B95.5]  Yes    Streptococcal bacteremia [R78.81, B95.5]  Yes    Immunosuppressed status [D89.9]  Unknown    Acute pulmonary edema [J81.0]  Unknown    ESRD (end stage renal disease) on dialysis [N18.6, Z99.2] 09/05/2020 Not Applicable    Elevated troponin [R79.89] 09/05/2020 Yes    Type 2 diabetes mellitus, with long-term current use of insulin [E11.9, Z79.4] 09/05/2020 Not Applicable    Hypothyroidism [E03.9] 09/05/2020 Yes    HLD (hyperlipidemia) [E78.5] 09/05/2020 Yes    Heart transplant recipient [Z94.1] 09/05/2020 Not Applicable      Problems Resolved During this Admission:    Diagnosis Date Noted Date Resolved  POA    Hyperkalemia [E87.5] 09/05/2020 09/06/2020 Yes    Encephalopathy, metabolic [G93.41] 09/05/2020 09/06/2020 Yes       Discharged Condition: stable    Disposition: Home or Self Care    Follow Up:  Follow-up Information     Corby Perdomo MD In 1 week.    Specialty: Family Medicine  Why: hospital follow up  Contact information:  [address] [id number] [phone]             [facility] In 2 days.    Contact information:  [address] [place] [address] [phone]           Corby Perdomo MD In 2 weeks.    Specialty: Family Medicine  Why: hosptial follow up  Contact information:  [address] [address] [phone]                 Patient Instructions:      Diet renal     Diet Cardiac     Diet diabetic     Notify your health care provider if you experience any of the following:  temperature >100.4     Notify your health care provider if you experience any of the following:  redness, tenderness, or signs of infection (pain, swelling, redness, odor or green/yellow discharge around incision site)     Notify your health care provider if you experience any of the following:  temperature >100.4     Notify your health care provider if you experience any of the following:  severe uncontrolled pain     Notify your health care provider if you experience any of the following:  redness, tenderness, or signs of infection (pain, swelling, redness, odor or green/yellow discharge around incision site)     Activity as tolerated       Significant Diagnostic Studies: Labs:   BMP:   Recent Labs   Lab 09/07/20  0354  09/07/20  2350 09/08/20  0354 09/08/20  0619   *   < > 134* 123* 125*      < > 139 139 139   K 4.2   < > 4.2  4.2 4.2  4.2 4.3      < > 102 103 104   CO2 26   < > 23 23 23   BUN 28*   < > 39* 40* 41*   CREATININE 3.5*   < > 4.2* 4.2* 4.4*   CALCIUM 8.3*   < > 8.4* 8.7 8.8   MG 1.9  --   --   --  1.9    < > = values in this interval  not displayed.   , CMP   Recent Labs   Lab 09/07/20  2350 09/08/20 0354 09/08/20 0619    139 139   K 4.2  4.2 4.2  4.2 4.3    103 104   CO2 23 23 23   * 123* 125*   BUN 39* 40* 41*   CREATININE 4.2* 4.2* 4.4*   CALCIUM 8.4* 8.7 8.8   ANIONGAP 14 13 12   ESTGFRAFRICA 15* 15* 14*   EGFRNONAA 13* 13* 12*   , CBC   Recent Labs   Lab 09/07/20  0354 09/08/20 0619   WBC 7.81 6.30   HGB 11.8* 9.5*   HCT 36.9* 28.0*   * 115*    and All labs within the past 24 hours have been reviewed    Pending Diagnostic Studies:     Procedure Component Value Units Date/Time    Basic metabolic panel [[id number]]     Order Status: Sent Lab Status: No result     Specimen: Blood     Basic metabolic panel [[id number]]     Order Status: Sent Lab Status: No result     Specimen: Blood     Echo Color Flow Doppler? Yes [[id number]]  (Abnormal) Resulted: 09/08/20 0923    Order Status: Sent Lab Status: In process Updated: 09/08/20 0923     BSA 2.28 m2      TDI SEPTAL 0.10 m/s      LA WIDTH 4.43 cm      TDI LATERAL 0.15 m/s      LVIDD 4.80 cm      IVS 1.86 cm      PW 1.39 cm      Ao root annulus 3.66 cm      LVIDS 3.00 cm      FS 38 %      LA volume 62.23 cm3      Sinus 3.44 cm      STJ 2.63 cm      Ascending aorta 3.58 cm      LV mass 342.61 g      LA size 3.70 cm      TAPSE 1.34 cm      Left Ventricle Relative Wall Thickness 0.58 cm      AV mean gradient 6 mmHg      AV valve area 2.66 cm2      AV Velocity Ratio 0.48     AV index (prosthetic) 0.55     PV peak gradient 2.03 mmHg      Mean e' 0.13 m/s      LVOT diameter 2.47 cm      LVOT area 4.8 cm2      LVOT peak krzysztof 0.80 m/s      LVOT peak VTI 16.38 cm      Ao peak krzysztof 1.65 m/s      Ao VTI 29.52 cm      RVOT peak krzysztof 0.71 m/s      RVOT peak VTI 14.87 cm      LVOT stroke volume 78.45 cm3      AV peak gradient 11 mmHg      PV mean gradient 1.04 mmHg      TR Max Krzysztof 3.98 m/s      LV Systolic Volume 35.09 mL      LV Systolic Volume Index 15.9 mL/m2      LV Diastolic Volume  107.42 mL      LV Diastolic Volume Index 48.81 mL/m2      LA Volume Index 28.3 mL/m2      LV Mass Index 156 g/m2      RA Major Axis 4.36 cm      Left Atrium Minor Axis 4.01 cm      Left Atrium Major Axis 5.04 cm      Triscuspid Valve Regurgitation Peak Gradient 63 mmHg     Hepatitis B Surface Antibody, Qual/Quant [[id number]] Collected: 09/06/20 0000    Order Status: Sent Lab Status: In process Updated: 09/06/20 1959    Specimen: Blood     Potassium [[id number]]     Order Status: Sent Lab Status: No result     Specimen: Blood     Potassium [[id number]]     Order Status: Sent Lab Status: No result     Specimen: Blood          Medications:  Transfer Medications (for Discharge Readmit only):   Current Facility-Administered Medications   Medication Dose Route Frequency Provider Last Rate Last Dose    acetaminophen tablet 650 mg  650 mg Oral Q6H PRN Yomaira Mccall NP        atorvastatin tablet 20 mg  20 mg Oral Daily Yomaira Mccall NP   20 mg at 09/08/20 0948    calcium gluconate 1g in dextrose 5% 100mL (ready to mix system)  1 g Intravenous Q10 Min PRN Yomaira Mccall NP   1 g at 09/06/20 0551    cefTRIAXone (ROCEPHIN) 1 g/50 mL D5W IVPB  1 g Intravenous Q12H Rajwinder Lacy MD   1 g at 09/08/20 0316    cetirizine tablet 10 mg  10 mg Oral Daily Yomaira Mccall NP   10 mg at 09/08/20 0948    dextrose 50% injection 12.5 g  12.5 g Intravenous PRN Yomaira Mccall NP        dextrose 50% injection 25 g  25 g Intravenous PRN Yomaira Mccall NP        docusate sodium capsule 100 mg  100 mg Oral Nightly Yomaira Mccall NP   100 mg at 09/07/20 2031    donepeziL tablet 5 mg  5 mg Oral QHS Yomaira Mccall NP   5 mg at 09/07/20 2031    escitalopram oxalate tablet 5 mg  5 mg Oral Daily Yomaira Mccall NP   5 mg at 09/08/20 0947    ferrous sulfate EC tablet 325 mg  325 mg Oral Daily Yomaira Mccall NP   325 mg at 09/08/20 0948    fluticasone propionate 50 mcg/actuation nasal spray 50 mcg  1 spray  Each Nostril Daily PRN Yomaira Mccall NP        gabapentin capsule 300 mg  300 mg Oral BID Yomaira Mccall NP   300 mg at 09/08/20 0948    glucagon (human recombinant) injection 1 mg  1 mg Intramuscular PRN Yomaira Mccall NP        glucose chewable tablet 16 g  16 g Oral PRN Yomaira Mccall NP        glucose chewable tablet 24 g  24 g Oral PRN Yomaira Mccall NP        HYDROcodone-acetaminophen  mg per tablet 1 tablet  1 tablet Oral Q6H PRN KODY Lubin   1 tablet at 09/08/20 0525    insulin aspart U-100 pen 1-10 Units  1-10 Units Subcutaneous QID (AC + HS) PRN Yomaira Mccall NP   2 Units at 09/07/20 1610    levothyroxine tablet 150 mcg  150 mcg Oral Daily Yomaira Mccall NP   150 mcg at 09/08/20 0525    metoclopramide HCl tablet 5 mg  5 mg Oral TID AC Yomaira Mccall NP   5 mg at 09/08/20 0545    multivitamin tablet  1 tablet Oral Daily Yomaira Mccall NP   1 tablet at 09/08/20 0948    mycophenolate tablet 500 mg  500 mg Oral BID Aleisha Beavers MD   500 mg at 09/08/20 0948    pantoprazole EC tablet 40 mg  40 mg Oral Daily Yomaira Mccall NP   40 mg at 09/08/20 0947    tacrolimus capsule 2 mg  2 mg Oral BID Yomaira Mccall NP   2 mg at 09/08/20 0954    vancomycin - pharmacy to dose   Intravenous pharmacy to manage frequency Yomaira Mccall NP           Indwelling Lines/Drains at time of discharge:   Lines/Drains/Airways     Drain                 Hemodialysis AV Fistula Right upper arm -- days                Time spent on the discharge of patient: 90 minutes  Patient was seen and examined on the date of discharge and determined to be suitable for discharge.         KODY Keenan  Department of Hospital Medicine  [facility]

## 2020-09-08 NOTE — NURSING
Patient is oriented X4. VSS.   Patient remained afebrile throughout shift.  Patient remained free of falls this shift  Patient free of pain this shift.  AC/HS blood glucose monitored, corrected via SSI.  Plan of care reviewed with pt and spouse  Pt verbalized understanding.  Patient walking with X1 assist.  Frequent weight shifting encouraged.  Patient atrial-paced on monitor  Bed low, side rails up x2, wheels locked, call light in reach.  Bed alarm maintained for safety.  Patient instructed to call for assistance.  Hourly rounding completed.  Will continue to monitor.       Problem: Fall Injury Risk  Goal: Absence of Fall and Fall-Related Injury  Outcome: Ongoing, Progressing  Intervention: Identify and Manage Contributors to Fall Injury Risk  Flowsheets (Taken 9/6/2020 0527)  Medication Review/Management: medications reviewed     Problem: Adult Inpatient Plan of Care  Goal: Plan of Care Review  Outcome: Ongoing, Progressing  Goal: Patient-Specific Goal (Individualization)  Outcome: Ongoing, Progressing  Goal: Absence of Hospital-Acquired Illness or Injury  Outcome: Ongoing, Progressing  Intervention: Identify and Manage Fall Risk  Flowsheets (Taken 9/6/2020 0527)  Safety Promotion/Fall Prevention:   bed alarm set   assistive device/personal item within reach   side rails raised x 3   Fall Risk reviewed with patient/family  Goal: Optimal Comfort and Wellbeing  Outcome: Ongoing, Progressing  Intervention: Provide Person-Centered Care  Flowsheets (Taken 9/6/2020 0527)  Trust Relationship/Rapport: care explained  Goal: Readiness for Transition of Care  Outcome: Ongoing, Progressing  Goal: Rounds/Family Conference  Outcome: Ongoing, Progressing     Problem: Diabetes Comorbidity  Goal: Blood Glucose Level Within Desired Range  Outcome: Ongoing, Progressing  Intervention: Maintain Glycemic Control  Flowsheets (Taken 9/6/2020 0527)  Glycemic Management: blood glucose monitoring     Problem: Infection  Goal: Infection  Symptom Resolution  Outcome: Ongoing, Progressing  Intervention: Prevent or Manage Infection  Flowsheets (Taken 9/6/2020 0527)  Fever Reduction/Comfort Measures:   lightweight bedding   lightweight clothing     Problem: Skin Injury Risk Increased  Goal: Skin Health and Integrity  Outcome: Ongoing, Progressing  Intervention: Optimize Skin Protection  Flowsheets (Taken 9/6/2020 0527)  Pressure Reduction Techniques:   frequent weight shift encouraged   weight shift assistance provided   pressure points protected  Head of Bed (HOB): HOB elevated

## 2020-09-08 NOTE — PROGRESS NOTES
Pharmacokinetic Assessment Follow Up: IV Vancomycin    Vancomycin serum concentration assessment(s):  Vancomycin pre-dialysis random level @ 0619 this AM = 20.5 mcg/ml    Vancomycin Regimen Plan:  Per protocol for a pre-HD random level of 15-25 mcg/ml, we will give a 500 mg dose post-dialysis today  Next pre-HD random level due Thurs 9/10 @ 0600 with AM labs  Continue to re-dose for levels < 25 mcg/ml    Drug levels (last 3 results):  Recent Labs   Lab Result Units 09/06/20  2231 09/08/20  0619   Vancomycin, Random ug/mL 12.7 20.5       Pharmacy will continue to follow and monitor vancomycin.    Please contact pharmacy at extension [phone] for questions regarding this assessment.    Thank you for the consult,   Katherine McArdle, Pharm.D. 9/8/2020 11:32 AM       Patient brief summary:  Rafael Doe is a 75 y.o. male initiated on antimicrobial therapy with IV Vancomycin for treatment of skin & soft tissue infection & Strep bacteremia    The patient's current regimen is pulse dosing PRN based on pre-dialysis random levels     Drug Allergies:   Review of patient's allergies indicates:  No Known Allergies    Actual Body Weight:   108 kg    Renal Function:   Estimated Creatinine Clearance: 17 mL/min (A) (based on SCr of 4.4 mg/dL (H)).     Dialysis Method (if applicable):  intermittent HD -- every Tues, Thurs, Sat     CBC (last 72 hours):  Recent Labs   Lab Result Units 09/05/20  1600 09/06/20  0343 09/07/20  0354 09/08/20  0619   WBC K/uL 14.64* 8.37 7.81 6.30   Hemoglobin g/dL 10.6* 9.6* 11.8* 9.5*   Hemoglobin A1C %  --  6.2*  --   --    Hematocrit % 33.8* 31.0* 36.9* 28.0*   Platelets K/uL 123* 118* 122* 115*   Gran% % 85.8* 78.2* 74.9* 71.3   Lymph% % 6.0* 10.9* 12.4* 14.1*   Mono% % 7.5 9.7 10.0 8.9   Eosinophil% % 0.1 0.5 2.0 3.0   Basophil% % 0.1 0.1 0.3 0.3   Differential Method  Automated Automated Automated Automated       Metabolic Panel (last 72 hours):  Recent Labs   Lab Result Units 09/05/20  1600  09/05/20  2101 09/06/20  0000 09/06/20  0343 09/06/20  0750 09/06/20  1132 09/06/20  1555 09/06/20  1954 09/06/20  2231 09/07/20  0354 09/07/20  0747 09/07/20  1130 09/07/20  1642 09/07/20 2006 09/07/20  2350 09/08/20  0354 09/08/20  0619   Sodium mmol/L 136 136 139 139  139 139 140 140 139 140 139 138 137 140 139 139 139 139   Potassium mmol/L 5.4* 6.1*  6.1* 6.6*  6.6* 5.4*  5.4*  5.4* 4.7  4.7 4.9  4.9 5.0  5.0 3.6  3.6 5.6*  5.6* 4.2 4.0  4.0 3.9  3.9 5.2*  5.2* 4.3  4.3 4.2  4.2 4.2  4.2 4.3   Chloride mmol/L 106 109 109 107  107 108 107 106 102 105 101 100 101 103 102 102 103 104   CO2 mmol/L 19* 18* 17* 17*  17* 19* 20* 21* 26 18* 26 27 26 23 24 23 23 23   Glucose mg/dL 165* 164* 163* 197*  197* 142* 156* 154* 132* 130* 164* 131* 153* 159* 142* 134* 123* 125*   BUN, Bld mg/dL 46* 49* 51* 50*  50* 49* 51* 53* 26* 22 28* 29* 31* 38* 38* 39* 40* 41*   Creatinine mg/dL 4.7* 4.7* 4.8* 4.7*  4.7* 4.6* 4.7* 4.9* 2.8* 2.9* 3.5* 3.5* 3.6* 4.1* 4.1* 4.2* 4.2* 4.4*   Albumin g/dL 3.5  --   --   --   --   --   --   --   --   --   --   --   --   --   --   --   --    Total Bilirubin mg/dL 0.8  --   --   --   --   --   --   --   --   --   --   --   --   --   --   --   --    Alkaline Phosphatase U/L 78  --   --   --   --   --   --   --   --   --   --   --   --   --   --   --   --    AST U/L 17  --   --   --   --   --   --   --   --   --   --   --   --   --   --   --   --    ALT U/L 9*  --   --   --   --   --   --   --   --   --   --   --   --   --   --   --   --    Magnesium mg/dL  --   --   --  2.1  --   --   --   --   --  1.9  --   --   --   --   --   --  1.9       Vancomycin Administrations:  vancomycin given in the last 96 hours                   vancomycin in dextrose 5 % 1 gram/250 mL IVPB 1,000 mg (mg) 1,000 mg New Bag 09/06/20 2337    vancomycin 2 g in dextrose 5 % 500 mL IVPB ()  Restarted 09/06/20 0247     2,000 mg New Bag 09/05/20 2015                Microbiologic Results:  Microbiology  Results (last 7 days)     Procedure Component Value Units Date/Time    Blood culture [[id number]]  (Abnormal) Collected: 09/05/20 1650    Order Status: Completed Specimen: Blood from Peripheral, Hand, Left Updated: 09/08/20 0845     Blood Culture, Routine Gram stain peds bottle: Gram positive cocci in chains resembling Strep      Results called to and read back by: Lizbeth Garibay RN  09/06/2020        21:18      STREPTOCOCCUS GORDONII    Blood culture [[id number]] Collected: 09/05/20 1600    Order Status: Completed Specimen: Blood from Peripheral, Antecubital, Left Updated: 09/08/20 0613     Blood Culture, Routine No Growth to date      No Growth to date      No Growth to date    Blood culture [[id number]] Collected: 09/07/20 0917    Order Status: Completed Specimen: Blood Updated: 09/07/20 1945     Blood Culture, Routine No Growth to date    Blood culture [[id number]] Collected: 09/07/20 0917    Order Status: Completed Specimen: Blood Updated: 09/07/20 1945     Blood Culture, Routine No Growth to date
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
## 2020-09-08 NOTE — PLAN OF CARE
09/08/20 1119   Final Note   Assessment Type Final Discharge Note   Anticipated Discharge Disposition Home   What phone number can be called within the next 1-3 days to see how you are doing after discharge? [phone]   Hospital Follow Up  Appt(s) scheduled? Yes   Discharge plans and expectations educations in teach back method with documentation complete? Yes   Right Care Referral Info   Post Acute Recommendation No Care       Neno Valero LMSW 9/8/2020 11:20 AM

## 2020-09-08 NOTE — SUBJECTIVE & OBJECTIVE
Interval History: Discussed with ID - will need 2 weeks IV vancomycin from negative cx. Pt can get vanc post HD at HD center.    Review of Systems   Constitutional: Negative for activity change, appetite change, chills, fatigue and fever.   HENT: Negative for dental problem, ear pain, hearing loss, mouth sores, nosebleeds, sinus pressure, sore throat, tinnitus and trouble swallowing.    Eyes: Negative for pain, discharge and visual disturbance.   Respiratory: Negative for cough, choking, chest tightness, shortness of breath and wheezing.    Cardiovascular: Negative for chest pain, palpitations and leg swelling.   Gastrointestinal: Negative.  Negative for abdominal distention, abdominal pain, anal bleeding, blood in stool, constipation, diarrhea, nausea and vomiting.   Endocrine: Negative for cold intolerance, heat intolerance, polydipsia, polyphagia and polyuria.   Genitourinary: Negative for decreased urine volume, difficulty urinating, flank pain, frequency, hematuria and urgency.   Musculoskeletal: Negative for arthralgias, back pain, gait problem, myalgias, neck pain and neck stiffness.   Skin: Negative for color change, rash and wound.   Allergic/Immunologic: Negative for food allergies and immunocompromised state.   Neurological: Negative for dizziness, tremors, seizures, syncope, speech difficulty, weakness, light-headedness and headaches.   Hematological: Negative for adenopathy. Does not bruise/bleed easily.   Psychiatric/Behavioral: Negative for confusion and sleep disturbance. The patient is not nervous/anxious.    All other systems reviewed and are negative.    Objective:     Vital Signs (Most Recent):  Temp: 98 °F (36.7 °C) (09/08/20 0748)  Pulse: 82 (09/08/20 0748)  Resp: 18 (09/08/20 0748)  BP: 138/66 (09/08/20 0748)  SpO2: 96 % (09/08/20 0748) Vital Signs (24h Range):  Temp:  [97 °F (36.1 °C)-98.8 °F (37.1 °C)] 98 °F (36.7 °C)  Pulse:  [78-94] 82  Resp:  [18-20] 18  SpO2:  [93 %-100 %] 96 %  BP:  (110-138)/(59-73) 138/66     Weight: 108.2 kg (238 lb 8.6 oz)  Body mass index is 36.27 kg/m².    Intake/Output Summary (Last 24 hours) at 2020 0817  Last data filed at 2020 0600  Gross per 24 hour   Intake 458 ml   Output 300 ml   Net 158 ml      Physical Exam  Vitals signs and nursing note reviewed.   Constitutional:       Appearance: Normal appearance. He is obese.      Comments:      HENT:      Head: Normocephalic and atraumatic.      Nose: Nose normal. No congestion.      Mouth/Throat:      Mouth: Mucous membranes are moist.   Eyes:      General: No scleral icterus.     Pupils: Pupils are equal, round, and reactive to light.   Neck:      Musculoskeletal: Normal range of motion and neck supple. No muscular tenderness.   Cardiovascular:      Rate and Rhythm: Normal rate and regular rhythm.      Pulses:           Dorsalis pedis pulses are 1+ on the right side and 1+ on the left side.      Heart sounds: Normal heart sounds.      Comments: LCW pacemaker present  Pulmonary:      Effort: Pulmonary effort is normal.      Breath sounds: Normal breath sounds.   Abdominal:      General: Abdomen is flat. Bowel sounds are normal.      Palpations: Abdomen is soft.   Musculoskeletal: Normal range of motion.      Right lower le+ Edema (chronic per wife) present.      Left lower le+ Edema (chronic per wife) present.   Skin:     General: Skin is warm and dry.      Capillary Refill: Capillary refill takes less than 2 seconds.      Coloration: Skin is not jaundiced.      Findings: No bruising or erythema.      Comments: RUE AV fistula with (+) thrill/bruit  BLE coarse skin - chronic per wife   Neurological:      General: No focal deficit present.      Mental Status: He is alert, oriented to person, place, and time and easily aroused. Mental status is at baseline.      Motor: Weakness present.   Psychiatric:         Mood and Affect: Mood normal.         Behavior: Behavior normal.         Cognition and Memory:  Cognition is impaired.         Significant Labs:   Recent Lab Results       09/08/20  0619   09/08/20  0527   09/08/20  0354   09/07/20  2350   09/07/20  2035        Anion Gap 12   13 14       Blood Culture, Routine               BUN, Bld 41   40 39       Calcium 8.8   8.7 8.4       Chloride 104   103 102       CO2 23   23 23       Creatinine 4.4   4.2 4.2       eGFR if  14   15 15       eGFR if non  12  Comment:  Calculation used to obtain the estimated glomerular filtration  rate (eGFR) is the CKD-EPI equation.      13  Comment:  Calculation used to obtain the estimated glomerular filtration  rate (eGFR) is the CKD-EPI equation.    13  Comment:  Calculation used to obtain the estimated glomerular filtration  rate (eGFR) is the CKD-EPI equation.          Glucose 125   123 134       Hematocrit 28.0             Hemoglobin 9.5             Magnesium 1.9             MCH 32.3             MCHC 33.9             MCV 95             MPV 10.4             Platelets 115             POCT Glucose   123     137     Potassium 4.3   4.2 4.2            4.2 4.2       RBC 2.94             RDW 13.2             Sodium 139   139 139       Vancomycin, Random 20.5             WBC 6.30                              09/07/20  2006   09/07/20  1642   09/07/20  1605   09/07/20  1130   09/07/20  1120        Anion Gap 13 14   10       Blood Culture, Routine               BUN, Bld 38 38   31       Calcium 8.4 8.5   8.7       Chloride 102 103   101       CO2 24 23   26       Creatinine 4.1 4.1   3.6       eGFR if  15 15   18       eGFR if non  13  Comment:  Calculation used to obtain the estimated glomerular filtration  rate (eGFR) is the CKD-EPI equation.    13  Comment:  Calculation used to obtain the estimated glomerular filtration  rate (eGFR) is the CKD-EPI equation.      15  Comment:  Calculation used to obtain the estimated glomerular filtration  rate (eGFR) is the CKD-EPI  equation.          Glucose 142 159   153       Hematocrit               Hemoglobin               Magnesium               MCH               MCHC               MCV               MPV               Platelets               POCT Glucose     168   161     Potassium 4.3 5.2   3.9        4.3 5.2   3.9       RBC               RDW               Sodium 139 140   137       Vancomycin, Random               WBC                                09/07/20  0917        Anion Gap       Blood Culture, Routine No Growth to date[P]      No Growth to date[P]     BUN, Bld       Calcium       Chloride       CO2       Creatinine       eGFR if        eGFR if non African American       Glucose       Hematocrit       Hemoglobin       Magnesium       MCH       MCHC       MCV       MPV       Platelets       POCT Glucose       Potassium       RBC       RDW       Sodium       Vancomycin, Random       WBC           All pertinent labs within the past 24 hours have been reviewed.    Significant Imaging: I have reviewed all pertinent imaging results/findings within the past 24 hours.

## 2020-09-09 ENCOUNTER — PATIENT OUTREACH (OUTPATIENT)
Dept: ADMINISTRATIVE | Facility: CLINIC | Age: 75
End: 2020-09-09

## 2020-09-09 NOTE — PROGRESS NOTES
HD ordered 4 hours; pt tolerated 2 hours and 45 min    Net Removal 2.2 L    R AVF accessed and functioned well; pt tolerated tx well, but requested to be taken off early due to pain unrelated to tx

## 2020-09-09 NOTE — NURSING
AVS reviewed w/ pt & wife at bedside; verbalized understanding. Questions encouraged/answered. Informed of upcoming appts, new meds & where to p/u. Educated on when to contact MD/come to ER; issued add'l info. Midline removed w/ catheter intact. Tele-monitor removed & returned to monitor room. NADN at this time. Transportation arrangements made; son to p/u. Escorted out by PCT in stable condition.

## 2020-09-09 NOTE — PROGRESS NOTES
C3 nurse attempted to contact patient. No answer. The following message was left for the patient to return the call:  Good evening I am a nurse calling on behalf of [facility] from the [facility].  This is a Transitional Care Call for Rfaael. When you have a moment please contact us at [phone] or [phone] Monday through Friday, between the hours of 8 am to 4 pm. We look forward to speaking with you. On behalf of [facility] have a nice day.    The patient does not have a scheduled HOSFU appointment within 7-14 days post hospital discharge date 9/8/20. Non [facility] PCP.

## 2020-09-09 NOTE — PLAN OF CARE
09/09/20 1526   Final Note   Assessment Type Final Discharge Note   Anticipated Discharge Disposition Home   Right Care Referral Info   Post Acute Recommendation No Care

## 2020-09-10 LAB
HBV SURFACE AB SER QL IA: NEGATIVE
HBV SURFACE AB SERPL IA-ACNC: <3 MIU/ML

## 2020-09-11 LAB — BACTERIA BLD CULT: NORMAL

## 2020-09-12 LAB
BACTERIA BLD CULT: ABNORMAL